# Patient Record
Sex: FEMALE | Race: BLACK OR AFRICAN AMERICAN | Employment: FULL TIME | URBAN - METROPOLITAN AREA
[De-identification: names, ages, dates, MRNs, and addresses within clinical notes are randomized per-mention and may not be internally consistent; named-entity substitution may affect disease eponyms.]

---

## 2022-07-04 ENCOUNTER — APPOINTMENT (OUTPATIENT)
Dept: ULTRASOUND IMAGING | Age: 52
End: 2022-07-04
Payer: COMMERCIAL

## 2022-07-04 ENCOUNTER — HOSPITAL ENCOUNTER (EMERGENCY)
Dept: GENERAL RADIOLOGY | Age: 52
Discharge: HOME | End: 2022-07-07
Payer: COMMERCIAL

## 2022-07-04 ENCOUNTER — HOSPITAL ENCOUNTER (OUTPATIENT)
Age: 52
Setting detail: OBSERVATION
Discharge: HOME | End: 2022-07-06
Attending: EMERGENCY MEDICINE
Payer: COMMERCIAL

## 2022-07-04 DIAGNOSIS — R07.89 ATYPICAL CHEST PAIN: ICD-10-CM

## 2022-07-04 DIAGNOSIS — D64.9 SYMPTOMATIC ANEMIA: Primary | ICD-10-CM

## 2022-07-04 DIAGNOSIS — N93.9 VAGINAL BLEEDING: ICD-10-CM

## 2022-07-04 LAB
ALBUMIN SERPL-MCNC: 3.4 G/DL (ref 3.5–5)
ALBUMIN/GLOB SERPL: 0.8 {RATIO} (ref 1.2–3.5)
ALP SERPL-CCNC: 92 U/L (ref 50–136)
ALT SERPL-CCNC: 17 U/L (ref 12–65)
ANION GAP SERPL CALC-SCNC: 2 MMOL/L (ref 7–16)
AST SERPL-CCNC: 17 U/L (ref 15–37)
BILIRUB SERPL-MCNC: 0.3 MG/DL (ref 0.2–1.1)
BUN SERPL-MCNC: 10 MG/DL (ref 6–23)
CALCIUM SERPL-MCNC: 9.4 MG/DL (ref 8.3–10.4)
CHLORIDE SERPL-SCNC: 112 MMOL/L (ref 98–107)
CO2 SERPL-SCNC: 27 MMOL/L (ref 21–32)
CREAT SERPL-MCNC: 0.8 MG/DL (ref 0.6–1)
EKG ATRIAL RATE: 90 BPM
EKG DIAGNOSIS: NORMAL
EKG P AXIS: 57 DEGREES
EKG P-R INTERVAL: 132 MS
EKG Q-T INTERVAL: 344 MS
EKG QRS DURATION: 74 MS
EKG QTC CALCULATION (BAZETT): 420 MS
EKG R AXIS: -10 DEGREES
EKG T AXIS: 30 DEGREES
EKG VENTRICULAR RATE: 90 BPM
ERYTHROCYTE [DISTWIDTH] IN BLOOD BY AUTOMATED COUNT: 21.1 % (ref 11.9–14.6)
GLOBULIN SER CALC-MCNC: 4.5 G/DL (ref 2.3–3.5)
GLUCOSE SERPL-MCNC: 95 MG/DL (ref 65–100)
HCG UR QL: NEGATIVE
HCG, URINE, POC: NEGATIVE
HCT VFR BLD AUTO: 24.4 % (ref 35.8–46.3)
HGB BLD-MCNC: 6.5 G/DL (ref 11.7–15.4)
HISTORY CHECK: NORMAL
LIPASE SERPL-CCNC: 333 U/L (ref 73–393)
Lab: NORMAL
MAGNESIUM SERPL-MCNC: 2.2 MG/DL (ref 1.8–2.4)
MCH RBC QN AUTO: 16.8 PG (ref 26.1–32.9)
MCHC RBC AUTO-ENTMCNC: 26.6 G/DL (ref 31.4–35)
MCV RBC AUTO: 62.9 FL (ref 79.6–97.8)
NEGATIVE QC PASS/FAIL: NORMAL
NRBC # BLD: 0 K/UL (ref 0–0.2)
PLATELET # BLD AUTO: 343 K/UL (ref 150–450)
PMV BLD AUTO: 9.9 FL (ref 9.4–12.3)
POSITIVE QC PASS/FAIL: NORMAL
POTASSIUM SERPL-SCNC: 4 MMOL/L (ref 3.5–5.1)
PROT SERPL-MCNC: 7.9 G/DL (ref 6.3–8.2)
RBC # BLD AUTO: 3.88 M/UL (ref 4.05–5.2)
SODIUM SERPL-SCNC: 141 MMOL/L (ref 136–145)
TROPONIN I SERPL HS-MCNC: 3.2 PG/ML (ref 0–14)
TROPONIN I SERPL HS-MCNC: 3.4 PG/ML (ref 0–14)
WBC # BLD AUTO: 8.5 K/UL (ref 4.3–11.1)

## 2022-07-04 PROCEDURE — 99285 EMERGENCY DEPT VISIT HI MDM: CPT

## 2022-07-04 PROCEDURE — P9016 RBC LEUKOCYTES REDUCED: HCPCS

## 2022-07-04 PROCEDURE — 6360000002 HC RX W HCPCS: Performed by: PHYSICIAN ASSISTANT

## 2022-07-04 PROCEDURE — 71045 X-RAY EXAM CHEST 1 VIEW: CPT

## 2022-07-04 PROCEDURE — 96374 THER/PROPH/DIAG INJ IV PUSH: CPT

## 2022-07-04 PROCEDURE — 85027 COMPLETE CBC AUTOMATED: CPT

## 2022-07-04 PROCEDURE — 86901 BLOOD TYPING SEROLOGIC RH(D): CPT

## 2022-07-04 PROCEDURE — 86923 COMPATIBILITY TEST ELECTRIC: CPT

## 2022-07-04 PROCEDURE — 96375 TX/PRO/DX INJ NEW DRUG ADDON: CPT

## 2022-07-04 PROCEDURE — 76830 TRANSVAGINAL US NON-OB: CPT

## 2022-07-04 PROCEDURE — 83690 ASSAY OF LIPASE: CPT

## 2022-07-04 PROCEDURE — 85014 HEMATOCRIT: CPT

## 2022-07-04 PROCEDURE — 80053 COMPREHEN METABOLIC PANEL: CPT

## 2022-07-04 PROCEDURE — 81025 URINE PREGNANCY TEST: CPT

## 2022-07-04 PROCEDURE — 93005 ELECTROCARDIOGRAM TRACING: CPT | Performed by: EMERGENCY MEDICINE

## 2022-07-04 PROCEDURE — 84484 ASSAY OF TROPONIN QUANT: CPT

## 2022-07-04 PROCEDURE — 83735 ASSAY OF MAGNESIUM: CPT

## 2022-07-04 RX ORDER — DIPHENHYDRAMINE HYDROCHLORIDE 50 MG/ML
25 INJECTION INTRAMUSCULAR; INTRAVENOUS ONCE
Status: COMPLETED | OUTPATIENT
Start: 2022-07-04 | End: 2022-07-04

## 2022-07-04 RX ORDER — SODIUM CHLORIDE 0.9 % (FLUSH) 0.9 %
3 SYRINGE (ML) INJECTION EVERY 8 HOURS
Status: DISCONTINUED | OUTPATIENT
Start: 2022-07-04 | End: 2022-07-06 | Stop reason: HOSPADM

## 2022-07-04 RX ORDER — SODIUM CHLORIDE 9 MG/ML
INJECTION, SOLUTION INTRAVENOUS PRN
Status: DISCONTINUED | OUTPATIENT
Start: 2022-07-04 | End: 2022-07-05

## 2022-07-04 RX ADMIN — DIPHENHYDRAMINE HYDROCHLORIDE 25 MG: 50 INJECTION, SOLUTION INTRAMUSCULAR; INTRAVENOUS at 19:54

## 2022-07-04 ASSESSMENT — PAIN DESCRIPTION - ORIENTATION: ORIENTATION: LEFT

## 2022-07-04 ASSESSMENT — PAIN - FUNCTIONAL ASSESSMENT: PAIN_FUNCTIONAL_ASSESSMENT: 0-10

## 2022-07-04 ASSESSMENT — PAIN DESCRIPTION - LOCATION: LOCATION: CHEST

## 2022-07-04 ASSESSMENT — PAIN DESCRIPTION - DESCRIPTORS: DESCRIPTORS: ACHING

## 2022-07-04 ASSESSMENT — ENCOUNTER SYMPTOMS
GASTROINTESTINAL NEGATIVE: 1
RESPIRATORY NEGATIVE: 1

## 2022-07-04 ASSESSMENT — PAIN SCALES - GENERAL: PAINLEVEL_OUTOF10: 8

## 2022-07-04 NOTE — ED PROVIDER NOTES
[employer] Emergency Department Provider Note                   PCP:                None Provider               Age: 45 y.o. Sex: female       ICD-10-CM    1. Vaginal bleeding  N93.9    2. Atypical chest pain  R07.89    3. Symptomatic anemia  D64.9        DISPOSITION Decision To Admit 07/05/2022 01:23:07 AM       New Prescriptions    FERROUS SULFATE (IRON 325) 325 (65 FE) MG TABLET    Take 1 tablet by mouth 2 times daily       Orders Placed This Encounter   Procedures    XR CHEST PORTABLE    US PELVIS COMPLETE    Troponin    CBC    Comprehensive Metabolic Panel    Lipase    Magnesium    Hemoglobin and Hematocrit    Hemoglobin and Hematocrit    Cardiac Monitor    Pulse Oximetry    POCT Urine Dipstick    Verify hospital blood product consent form has been signed and witnessed    Vital Signs For Blood Product Transfusion    Transfusion Reaction Management    POC Pregnancy Urine Qual    POC Pregnancy Urine Qual    EKG 12 Lead    TYPE AND SCREEN    PREPARE RBC (CROSSMATCH), 1 Units    Saline lock IV        MDM  Number of Diagnoses or Management Options  Atypical chest pain  Symptomatic anemia  Vaginal bleeding  Diagnosis management comments: Patient is a 68-year-old female who presents with 1 month of vaginal bleeding. Associated with pelvic cramping. Found to be anemic with a hemoglobin of 6.5. Associated symptoms of dizziness and lightheadedness. She received 1 unit of blood and hemoglobin came up to 7.2. Patient still feels lightheaded and dizzy when she stands. Will consult hospitalist for admission. She states she is no longer having vaginal bleeding at this time.        Amount and/or Complexity of Data Reviewed  Discuss the patient with other providers: yes (Dr. Alaina Foster, Dr. Sarah Corrales (Duy Pierson), Dr. Emely Rowell)    Risk of Complications, Morbidity, and/or Mortality  Presenting problems: high  Diagnostic procedures: high  Management options: high    Patient Progress  Patient progress: stable       Ivin Pallas is a 45 y.o. female who presents to the Emergency Department with chief complaint of    Chief Complaint   Patient presents with    Chest Pain    Vaginal Bleeding      Patient is a 66-year-old female who presents with vaginal bleeding for 1 month. She states prior to this daily bleeding she was having regular monthly menstrual cycles. She reports some pelvic cramping. She reports chronic history of anemia for which she take iron supplements. She has an appointment to see [address] [place] [address] in [facility] at the end of August, but due to continued bleeding she came here for evaluation as they were closed today. She denies fever but says she feels dizzy and lightheaded when walking. She states she has had chest pain for 2 weeks. Nonexertional.  Comes and goes intermittently. Sometimes radiates down the left arm. Denies any history of coronary artery disease. No associated symptoms. No modifying factors. Review of Systems   Constitutional: Positive for fatigue. HENT: Negative. Respiratory: Negative. Cardiovascular: Positive for chest pain. Negative for palpitations and leg swelling. Gastrointestinal: Negative. Genitourinary: Positive for pelvic pain and vaginal bleeding. Musculoskeletal: Negative. Neurological: Negative. All other systems reviewed and are negative. No past medical history on file. No past surgical history on file. No family history on file. Social Connections:     Frequency of Communication with Friends and Family: Not on file    Frequency of Social Gatherings with Friends and Family: Not on file    Attends [employer] Services: Not on file    Active Member of Clubs or Organizations: Not on file    Attends Club or Organization Meetings: Not on file    Marital Status: Not on file        No Known Allergies     Vitals signs and nursing note reviewed.    Patient Vitals for the past 4 hrs:   Temp Pulse Resp BP SpO2   07/04/22 2315 -- 60 17 115/72 --   07/04/22 2235 97.5 °F (36.4 °C) 66 16 131/77 100 %   07/04/22 2230 -- 61 15 115/69 --   07/04/22 2215 -- 64 19 118/67 --   07/04/22 2200 -- 60 17 117/65 --   07/04/22 2145 -- 60 19 129/78 --   07/04/22 2130 -- 59 15 113/75 --          Physical Exam  Vitals and nursing note reviewed. Constitutional:       General: She is not in acute distress. Appearance: She is well-developed. She is obese. She is not ill-appearing or toxic-appearing. HENT:      Head: Normocephalic. Eyes:      Extraocular Movements: Extraocular movements intact. Cardiovascular:      Rate and Rhythm: Normal rate and regular rhythm. Pulses: Normal pulses. Heart sounds: Normal heart sounds. Pulmonary:      Effort: Pulmonary effort is normal.      Breath sounds: Normal breath sounds. Abdominal:      Palpations: Abdomen is soft. Tenderness: There is no abdominal tenderness. There is no guarding or rebound. Musculoskeletal:         General: No tenderness. Normal range of motion. Cervical back: Normal range of motion and neck supple. Right lower leg: No edema. Left lower leg: No edema. Lymphadenopathy:      Cervical: No cervical adenopathy. Skin:     General: Skin is warm and dry. Findings: No rash. Neurological:      General: No focal deficit present. Mental Status: She is alert. Mental status is at baseline. Psychiatric:         Mood and Affect: Mood normal.         Behavior: Behavior normal.         Thought Content:  Thought content normal.          Procedures      Labs Reviewed   CBC - Abnormal; Notable for the following components:       Result Value    RBC 3.88 (*)     Hemoglobin 6.5 (*)     Hematocrit 24.4 (*)     MCV 62.9 (*)     MCH 16.8 (*)     MCHC 26.6 (*)     RDW 21.1 (*)     All other components within normal limits   COMPREHENSIVE METABOLIC PANEL - Abnormal; Notable for the following components:    Chloride 112 (*) Anion Gap 2 (*)     Albumin 3.4 (*)     Globulin 4.5 (*)     Albumin/Globulin Ratio 0.8 (*)     All other components within normal limits   HEMOGLOBIN AND HEMATOCRIT - Abnormal; Notable for the following components:    Hemoglobin 7.2 (*)     Hematocrit 26.5 (*)     All other components within normal limits   TROPONIN   TROPONIN   LIPASE   MAGNESIUM   POC PREGNANCY UR-QUAL   POC PREGNANCY UR-QUAL   TYPE AND SCREEN   PREPARE RBC (CROSSMATCH)        US PELVIS COMPLETE   Final Result   1. Heterogeneous appearance of the myometrium. No discrete mass identified. 2.  Nonvisualization of both ovaries. XR CHEST PORTABLE   Final Result   No acute findings in the chest                          ED Course as of 07/05/22 0125   Mon Jul 04, 2022   1728 EKG 12 Lead  EKG shows normal sinus rhythm at rate of 90. No acute ischemic changes. No STEMI. [CYNDI]   1907 I spoke to Dr. Jacki Murillo of [facility] who will have someone from their office call the patient in the morning for a sooner appointment. [CYNDI]   1950 Requesting Benadryl for anxiety at this time. [CYNDI]   200 Pt says she is no longer having any vaginal bleeding [CYNDI]      ED Course User Index  [CYNDI] SAMM Vaz        Voice dictation software was used during the making of this note. This software is not perfect and grammatical and other typographical errors may be present. This note has not been completely proofread for errors.        Dimitry Vaz  07/05/22 5531

## 2022-07-05 PROBLEM — R07.89 ATYPICAL CHEST PAIN: Status: RESOLVED | Noted: 2022-07-05 | Resolved: 2022-07-05

## 2022-07-05 PROBLEM — R42 LIGHT-HEADEDNESS: Status: ACTIVE | Noted: 2022-07-05

## 2022-07-05 PROBLEM — R07.89 ATYPICAL CHEST PAIN: Status: ACTIVE | Noted: 2022-07-05

## 2022-07-05 PROBLEM — D50.0 IRON DEFICIENCY ANEMIA DUE TO CHRONIC BLOOD LOSS: Status: ACTIVE | Noted: 2022-07-05

## 2022-07-05 PROBLEM — R42 LIGHT-HEADEDNESS: Status: RESOLVED | Noted: 2022-07-05 | Resolved: 2022-07-05

## 2022-07-05 PROBLEM — N80.03 ADENOMYOSIS OF UTERUS: Status: ACTIVE | Noted: 2022-07-05

## 2022-07-05 PROBLEM — N93.9 VAGINAL BLEEDING: Status: ACTIVE | Noted: 2022-07-05

## 2022-07-05 PROBLEM — D64.9 SYMPTOMATIC ANEMIA: Status: ACTIVE | Noted: 2022-07-05

## 2022-07-05 LAB
ALBUMIN SERPL-MCNC: 3.1 G/DL (ref 3.5–5)
ALBUMIN/GLOB SERPL: 0.8 {RATIO} (ref 1.2–3.5)
ALP SERPL-CCNC: 81 U/L (ref 50–136)
ALT SERPL-CCNC: 14 U/L (ref 12–65)
ANION GAP SERPL CALC-SCNC: 6 MMOL/L (ref 7–16)
AST SERPL-CCNC: 17 U/L (ref 15–37)
BASOPHILS # BLD: 0.1 K/UL (ref 0–0.2)
BASOPHILS NFR BLD: 1 % (ref 0–2)
BILIRUB SERPL-MCNC: 0.4 MG/DL (ref 0.2–1.1)
BUN SERPL-MCNC: 9 MG/DL (ref 6–23)
CALCIUM SERPL-MCNC: 9.2 MG/DL (ref 8.3–10.4)
CHLORIDE SERPL-SCNC: 109 MMOL/L (ref 98–107)
CO2 SERPL-SCNC: 27 MMOL/L (ref 21–32)
CREAT SERPL-MCNC: 0.7 MG/DL (ref 0.6–1)
DIFFERENTIAL METHOD BLD: ABNORMAL
EOSINOPHIL # BLD: 0.1 K/UL (ref 0–0.8)
EOSINOPHIL NFR BLD: 1 % (ref 0.5–7.8)
ERYTHROCYTE [DISTWIDTH] IN BLOOD BY AUTOMATED COUNT: 23.9 % (ref 11.9–14.6)
FERRITIN SERPL-MCNC: 3 NG/ML (ref 8–388)
FOLATE SERPL-MCNC: 10.4 NG/ML (ref 3.1–17.5)
GLOBULIN SER CALC-MCNC: 4.1 G/DL (ref 2.3–3.5)
GLUCOSE SERPL-MCNC: 93 MG/DL (ref 65–100)
HCT VFR BLD AUTO: 26.5 % (ref 35.8–46.3)
HCT VFR BLD AUTO: 28.9 % (ref 35.8–46.3)
HGB BLD-MCNC: 7.2 G/DL (ref 11.7–15.4)
HGB BLD-MCNC: 8.1 G/DL (ref 11.7–15.4)
HGB RETIC QN AUTO: 16 PG (ref 29–35)
HISTORY CHECK: NORMAL
IMM GRANULOCYTES # BLD AUTO: 0 K/UL (ref 0–0.5)
IMM GRANULOCYTES NFR BLD AUTO: 0 % (ref 0–5)
IMM RETICS NFR: 36.5 % (ref 3–15.9)
IRON SERPL-MCNC: 23 UG/DL (ref 35–150)
LDH SERPL L TO P-CCNC: 154 U/L (ref 100–190)
LYMPHOCYTES # BLD: 1.7 K/UL (ref 0.5–4.6)
LYMPHOCYTES NFR BLD: 25 % (ref 13–44)
MCH RBC QN AUTO: 18.8 PG (ref 26.1–32.9)
MCHC RBC AUTO-ENTMCNC: 28 G/DL (ref 31.4–35)
MCV RBC AUTO: 66.9 FL (ref 79.6–97.8)
MONOCYTES # BLD: 0.6 K/UL (ref 0.1–1.3)
MONOCYTES NFR BLD: 8 % (ref 4–12)
NEUTS SEG # BLD: 4.4 K/UL (ref 1.7–8.2)
NEUTS SEG NFR BLD: 65 % (ref 43–78)
NRBC # BLD: 0 K/UL (ref 0–0.2)
PLATELET # BLD AUTO: 280 K/UL (ref 150–450)
PLATELET COMMENT: ADEQUATE
PMV BLD AUTO: 10 FL (ref 9.4–12.3)
POTASSIUM SERPL-SCNC: 3.8 MMOL/L (ref 3.5–5.1)
PROT SERPL-MCNC: 7.2 G/DL (ref 6.3–8.2)
RBC # BLD AUTO: 4.32 M/UL (ref 4.05–5.2)
RBC MORPH BLD: ABNORMAL
RETICS # AUTO: 0.05 M/UL (ref 0.03–0.1)
RETICS/RBC NFR AUTO: 1.1 % (ref 0.3–2)
SODIUM SERPL-SCNC: 142 MMOL/L (ref 136–145)
TIBC SERPL-MCNC: 419 UG/DL (ref 250–450)
TROPONIN I SERPL HS-MCNC: 3.7 PG/ML (ref 0–14)
TROPONIN I SERPL HS-MCNC: 4.1 PG/ML (ref 0–14)
VIT B12 SERPL-MCNC: 363 PG/ML (ref 193–986)
WBC # BLD AUTO: 6.9 K/UL (ref 4.3–11.1)
WBC MORPH BLD: ABNORMAL

## 2022-07-05 PROCEDURE — 96376 TX/PRO/DX INJ SAME DRUG ADON: CPT

## 2022-07-05 PROCEDURE — 96365 THER/PROPH/DIAG IV INF INIT: CPT

## 2022-07-05 PROCEDURE — 2580000003 HC RX 258: Performed by: HOSPITALIST

## 2022-07-05 PROCEDURE — 2580000003 HC RX 258: Performed by: INTERNAL MEDICINE

## 2022-07-05 PROCEDURE — 80053 COMPREHEN METABOLIC PANEL: CPT

## 2022-07-05 PROCEDURE — G0378 HOSPITAL OBSERVATION PER HR: HCPCS

## 2022-07-05 PROCEDURE — 84484 ASSAY OF TROPONIN QUANT: CPT

## 2022-07-05 PROCEDURE — 6360000002 HC RX W HCPCS: Performed by: OBSTETRICS & GYNECOLOGY

## 2022-07-05 PROCEDURE — 93005 ELECTROCARDIOGRAM TRACING: CPT | Performed by: FAMILY MEDICINE

## 2022-07-05 PROCEDURE — 82746 ASSAY OF FOLIC ACID SERUM: CPT

## 2022-07-05 PROCEDURE — 85046 RETICYTE/HGB CONCENTRATE: CPT

## 2022-07-05 PROCEDURE — 83550 IRON BINDING TEST: CPT

## 2022-07-05 PROCEDURE — 6360000002 HC RX W HCPCS: Performed by: INTERNAL MEDICINE

## 2022-07-05 PROCEDURE — 83615 LACTATE (LD) (LDH) ENZYME: CPT

## 2022-07-05 PROCEDURE — 82607 VITAMIN B-12: CPT

## 2022-07-05 PROCEDURE — 36415 COLL VENOUS BLD VENIPUNCTURE: CPT

## 2022-07-05 PROCEDURE — 96372 THER/PROPH/DIAG INJ SC/IM: CPT

## 2022-07-05 PROCEDURE — 82728 ASSAY OF FERRITIN: CPT

## 2022-07-05 PROCEDURE — 6370000000 HC RX 637 (ALT 250 FOR IP): Performed by: HOSPITALIST

## 2022-07-05 PROCEDURE — 85025 COMPLETE CBC W/AUTO DIFF WBC: CPT

## 2022-07-05 PROCEDURE — 99203 OFFICE O/P NEW LOW 30 MIN: CPT | Performed by: OBSTETRICS & GYNECOLOGY

## 2022-07-05 PROCEDURE — 2580000003 HC RX 258: Performed by: EMERGENCY MEDICINE

## 2022-07-05 PROCEDURE — 36430 TRANSFUSION BLD/BLD COMPNT: CPT

## 2022-07-05 PROCEDURE — 96366 THER/PROPH/DIAG IV INF ADDON: CPT

## 2022-07-05 PROCEDURE — P9040 RBC LEUKOREDUCED IRRADIATED: HCPCS

## 2022-07-05 PROCEDURE — 83540 ASSAY OF IRON: CPT

## 2022-07-05 RX ORDER — SODIUM CHLORIDE 0.9 % (FLUSH) 0.9 %
5-40 SYRINGE (ML) INJECTION PRN
Status: DISCONTINUED | OUTPATIENT
Start: 2022-07-05 | End: 2022-07-06 | Stop reason: HOSPADM

## 2022-07-05 RX ORDER — ONDANSETRON 4 MG/1
4 TABLET, ORALLY DISINTEGRATING ORAL EVERY 8 HOURS PRN
Status: DISCONTINUED | OUTPATIENT
Start: 2022-07-05 | End: 2022-07-06 | Stop reason: HOSPADM

## 2022-07-05 RX ORDER — SODIUM CHLORIDE 9 MG/ML
INJECTION, SOLUTION INTRAVENOUS PRN
Status: DISCONTINUED | OUTPATIENT
Start: 2022-07-05 | End: 2022-07-06 | Stop reason: HOSPADM

## 2022-07-05 RX ORDER — ASPIRIN 81 MG/1
81 TABLET, CHEWABLE ORAL DAILY
Status: ON HOLD | COMMUNITY
End: 2022-07-05 | Stop reason: HOSPADM

## 2022-07-05 RX ORDER — ACETAMINOPHEN 650 MG/1
650 SUPPOSITORY RECTAL EVERY 6 HOURS PRN
Status: DISCONTINUED | OUTPATIENT
Start: 2022-07-05 | End: 2022-07-06 | Stop reason: HOSPADM

## 2022-07-05 RX ORDER — SODIUM CHLORIDE 9 MG/ML
INJECTION, SOLUTION INTRAVENOUS PRN
Status: DISCONTINUED | OUTPATIENT
Start: 2022-07-05 | End: 2022-07-05

## 2022-07-05 RX ORDER — SODIUM CHLORIDE 0.9 % (FLUSH) 0.9 %
5-40 SYRINGE (ML) INJECTION EVERY 12 HOURS SCHEDULED
Status: DISCONTINUED | OUTPATIENT
Start: 2022-07-05 | End: 2022-07-06 | Stop reason: HOSPADM

## 2022-07-05 RX ORDER — ONDANSETRON 2 MG/ML
4 INJECTION INTRAMUSCULAR; INTRAVENOUS EVERY 6 HOURS PRN
Status: DISCONTINUED | OUTPATIENT
Start: 2022-07-05 | End: 2022-07-06 | Stop reason: HOSPADM

## 2022-07-05 RX ORDER — FERROUS SULFATE 325(65) MG
325 TABLET ORAL 2 TIMES DAILY
Qty: 60 TABLET | Refills: 0 | Status: SHIPPED | OUTPATIENT
Start: 2022-07-05

## 2022-07-05 RX ORDER — 0.9 % SODIUM CHLORIDE 0.9 %
500 INTRAVENOUS SOLUTION INTRAVENOUS ONCE
Status: COMPLETED | OUTPATIENT
Start: 2022-07-05 | End: 2022-07-05

## 2022-07-05 RX ORDER — POLYETHYLENE GLYCOL 3350 17 G/17G
17 POWDER, FOR SOLUTION ORAL DAILY PRN
Status: DISCONTINUED | OUTPATIENT
Start: 2022-07-05 | End: 2022-07-06 | Stop reason: HOSPADM

## 2022-07-05 RX ORDER — ACETAMINOPHEN 325 MG/1
650 TABLET ORAL EVERY 6 HOURS PRN
Status: DISCONTINUED | OUTPATIENT
Start: 2022-07-05 | End: 2022-07-06 | Stop reason: HOSPADM

## 2022-07-05 RX ADMIN — LEUPROLIDE ACETATE 3.75 MG: KIT at 13:48

## 2022-07-05 RX ADMIN — SODIUM CHLORIDE 500 ML: 9 INJECTION, SOLUTION INTRAVENOUS at 10:36

## 2022-07-05 RX ADMIN — SODIUM CHLORIDE, PRESERVATIVE FREE 3 ML: 5 INJECTION INTRAVENOUS at 09:29

## 2022-07-05 RX ADMIN — SODIUM CHLORIDE, PRESERVATIVE FREE 3 ML: 5 INJECTION INTRAVENOUS at 17:31

## 2022-07-05 RX ADMIN — SODIUM CHLORIDE, PRESERVATIVE FREE 10 ML: 5 INJECTION INTRAVENOUS at 21:05

## 2022-07-05 RX ADMIN — SODIUM CHLORIDE, PRESERVATIVE FREE 10 ML: 5 INJECTION INTRAVENOUS at 09:24

## 2022-07-05 RX ADMIN — SODIUM CHLORIDE 25 MG: 9 INJECTION, SOLUTION INTRAVENOUS at 09:23

## 2022-07-05 RX ADMIN — ACETAMINOPHEN 650 MG: 325 TABLET, FILM COATED ORAL at 18:45

## 2022-07-05 RX ADMIN — SODIUM CHLORIDE 975 MG: 9 INJECTION, SOLUTION INTRAVENOUS at 11:15

## 2022-07-05 ASSESSMENT — PAIN DESCRIPTION - ORIENTATION: ORIENTATION: MID

## 2022-07-05 ASSESSMENT — PAIN SCALES - GENERAL
PAINLEVEL_OUTOF10: 0
PAINLEVEL_OUTOF10: 4
PAINLEVEL_OUTOF10: 0

## 2022-07-05 ASSESSMENT — PAIN DESCRIPTION - ONSET: ONSET: GRADUAL

## 2022-07-05 ASSESSMENT — ENCOUNTER SYMPTOMS
EYES NEGATIVE: 1
RESPIRATORY NEGATIVE: 1
GASTROINTESTINAL NEGATIVE: 1
ALLERGIC/IMMUNOLOGIC NEGATIVE: 1

## 2022-07-05 ASSESSMENT — PAIN DESCRIPTION - DESCRIPTORS: DESCRIPTORS: ACHING

## 2022-07-05 ASSESSMENT — PAIN DESCRIPTION - LOCATION: LOCATION: HEAD

## 2022-07-05 ASSESSMENT — PAIN DESCRIPTION - FREQUENCY: FREQUENCY: CONTINUOUS

## 2022-07-05 ASSESSMENT — PAIN DESCRIPTION - PAIN TYPE: TYPE: ACUTE PAIN

## 2022-07-05 NOTE — CONSULTS
Date: 7/5/2022        Patient Name: Josafat Arredondo     YOB: 1970          Chief Complaint     Chief Complaint   Patient presents with    Chest Pain    Vaginal Bleeding    us consistant with adenomyosis    Pt 44 yo with hx regular menses till last month. Noted continuous vaginal bleeding x one month, had arranged appt with new gyn, but not till aug. Er for what turned out to be symptomatic anemia    Hx sign reported normal pap 2020  btl with last pregnancy 2006    reletively recent start low dose asa    Surgery, btl, choly, thyroids    History of Present Illness   Josafat Arredondo is a 45 y.o.     hx regular menses till last month. Noted continuous vaginal bleeding x one month, had arranged appt with new gyn, but not till aug. Er for what turned out to be symptomatic anemia    Hx sign reported normal pap 2020  btl with last pregnancy 2006    reletively recent start low dose asa    Surgery, btl, choly, thyroids    No tobacco, occasional MJ    Past Medical History   No past medical history on file. Past Surgical History   No past surgical history on file.      Medications      Current Facility-Administered Medications:     sodium chloride flush 0.9 % injection 5-40 mL, 5-40 mL, IntraVENous, 2 times per day, Galo Sparrow MD    sodium chloride flush 0.9 % injection 5-40 mL, 5-40 mL, IntraVENous, PRN, Jc Anand MD    0.9 % sodium chloride infusion, , IntraVENous, PRN, Galo Sparrow MD    ondansetron (ZOFRAN-ODT) disintegrating tablet 4 mg, 4 mg, Oral, Q8H PRN **OR** ondansetron (ZOFRAN) injection 4 mg, 4 mg, IntraVENous, Q6H PRN, Galo Sparrow MD    polyethylene glycol (GLYCOLAX) packet 17 g, 17 g, Oral, Daily PRN, Galo Sparrow MD    acetaminophen (TYLENOL) tablet 650 mg, 650 mg, Oral, Q6H PRN **OR** acetaminophen (TYLENOL) suppository 650 mg, 650 mg, Rectal, Q6H PRN, Jc Anand MD    0.9 % sodium chloride infusion, , IntraVENous, PRN, Galo Sparrow MD  [facility] iron dextran complex (INFED) 25 mg in sodium chloride 0.9 % 50 mL (test dose) IVPB, 25 mg, IntraVENous, Once, [employer], DO    iron dextran complex (INFED) 975 mg in sodium chloride 0.9 % 500 mL IVPB, 975 mg, IntraVENous, Once, [employer], DO    leuprolide (LUPRON) injection 3.75 mg, 3.75 mg, IntraMUSCular, Once, Fatemeh Duncan MD    [COMPLETED] Saline lock IV, , , Continuous **AND** sodium chloride flush 0.9 % injection 3 mL, 3 mL, IntraVENous, Q8H, Shereen Davenport MD    0.9 % sodium chloride infusion, , IntraVENous, PRN, SAMM Brito     Allergies   Patient has no known allergies. Social History     Social History    None         Family History   No family history on file. Review of Systems   Review of Systems   Constitutional: Positive for fatigue. HENT: Negative. Eyes: Negative. Respiratory: Negative. Cardiovascular: Negative. Gastrointestinal: Negative. Endocrine: Negative. Genitourinary: Positive for menstrual problem and vaginal bleeding. Musculoskeletal: Negative. Skin: Negative. Allergic/Immunologic: Negative. Neurological: Positive for light-headedness. Negative for syncope. Hematological: Negative. Psychiatric/Behavioral: Negative. All other systems reviewed and are negative. Physical Exam     ECOG PERFORMANCE STATUS - 0-Fully active, able to carry on all pre-disease performance without restriction. Blood pressure 124/68, pulse 63, temperature 98.9 °F (37.2 °C), temperature source Oral, resp. rate 18, height 5' 4\" (1.626 m), weight 183 lb 6.4 oz (83.2 kg), SpO2 100 %. Physical Exam  Vitals and nursing note reviewed. Exam conducted with a chaperone present. Constitutional:       Appearance: Normal appearance. She is normal weight. HENT:      Head: Normocephalic and atraumatic. Cardiovascular:      Rate and Rhythm: Normal rate and regular rhythm. Heart sounds: Normal heart sounds.    Pulmonary:      Effort: Pulmonary effort is normal. No respiratory distress. Breath sounds: Normal breath sounds. Abdominal:      General: Abdomen is flat. Palpations: Abdomen is soft. Genitourinary:     General: Normal vulva. Vagina: No vaginal discharge. Comments: No blood in vagina, cervix palpable normal  Musculoskeletal:         General: No swelling. Normal range of motion. Skin:     General: Skin is warm and dry. Neurological:      General: No focal deficit present. Mental Status: She is alert and oriented to person, place, and time. Psychiatric:         Mood and Affect: Mood normal.         Behavior: Behavior normal.         Thought Content:  Thought content normal.         Judgment: Judgment normal.         Labs        Recent Results (from the past 48 hour(s))   POC Pregnancy Urine Qual    Collection Time: 07/04/22 12:00 AM   Result Value Ref Range    HCG, Urine, POC Negative Negative    Lot Number [id number]     Positive QC Pass/Fail Pass     Negative QC Pass/Fail Pass    EKG 12 Lead    Collection Time: 07/04/22  3:55 PM   Result Value Ref Range    Ventricular Rate 90 BPM    Atrial Rate 90 BPM    P-R Interval 132 ms    QRS Duration 74 ms    Q-T Interval 344 ms    QTc Calculation (Bazett) 420 ms    P Axis 57 degrees    R Axis -10 degrees    T Axis 30 degrees    Diagnosis Normal sinus rhythm    Troponin    Collection Time: 07/04/22  3:59 PM   Result Value Ref Range    Troponin, High Sensitivity 3.2 0 - 14 pg/mL   CBC    Collection Time: 07/04/22  3:59 PM   Result Value Ref Range    WBC 8.5 4.3 - 11.1 K/uL    RBC 3.88 (L) 4.05 - 5.2 M/uL    Hemoglobin 6.5 (LL) 11.7 - 15.4 g/dL    Hematocrit 24.4 (L) 35.8 - 46.3 %    MCV 62.9 (L) 79.6 - 97.8 FL    MCH 16.8 (L) 26.1 - 32.9 PG    MCHC 26.6 (L) 31.4 - 35.0 g/dL    RDW 21.1 (H) 11.9 - 14.6 %    Platelets 715 908 - 685 K/uL    MPV 9.9 9.4 - 12.3 FL    nRBC 0.00 0.0 - 0.2 K/uL   Comprehensive Metabolic Panel    Collection Time: 07/04/22  3:59 PM   Result Value Ref Range    Sodium 141 136 - 145 mmol/L    Potassium 4.0 3.5 - 5.1 mmol/L    Chloride 112 (H) 98 - 107 mmol/L    CO2 27 21 - 32 mmol/L    Anion Gap 2 (L) 7 - 16 mmol/L    Glucose 95 65 - 100 mg/dL    BUN 10 6 - 23 MG/DL    CREATININE 0.80 0.6 - 1.0 MG/DL    GFR African American >60 >60 ml/min/1.73m2    GFR Non- >60 >60 ml/min/1.73m2    Calcium 9.4 8.3 - 10.4 MG/DL    Total Bilirubin 0.3 0.2 - 1.1 MG/DL    ALT 17 12 - 65 U/L    AST 17 15 - 37 U/L    Alk Phosphatase 92 50 - 136 U/L    Total Protein 7.9 6.3 - 8.2 g/dL    Albumin 3.4 (L) 3.5 - 5.0 g/dL    Globulin 4.5 (H) 2.3 - 3.5 g/dL    Albumin/Globulin Ratio 0.8 (L) 1.2 - 3.5     Lipase    Collection Time: 07/04/22  3:59 PM   Result Value Ref Range    Lipase 333 73 - 393 U/L   Magnesium    Collection Time: 07/04/22  3:59 PM   Result Value Ref Range    Magnesium 2.2 1.8 - 2.4 mg/dL   PREPARE RBC (CROSSMATCH), 1 Units    Collection Time: 07/04/22  5:45 PM   Result Value Ref Range    History Check Historical check performed    TYPE AND SCREEN    Collection Time: 07/04/22  6:02 PM   Result Value Ref Range    Crossmatch expiration date 07/07/2022,2101     ABO/Rh A POSITIVE     Antibody Screen NEG     Blood Bank Comment       BLOOD READY CALLED ER POD A AT 1855 7.4.22, SPOKE TO [facility]     Unit Number [id number]     Product Code Blood Bank  LR     Unit Divison 00     Dispense Status Blood Bank TRANSFUSED     Crossmatch Result Compatible     Unit Number [id number]     Product Code Blood Bank [facility] LRIR     Unit Divison 00     Dispense Status Blood Bank ISSUED     Crossmatch Result Compatible    Troponin    Collection Time: 07/04/22  6:07 PM   Result Value Ref Range    Troponin, High Sensitivity 3.4 0 - 14 pg/mL   POC Pregnancy Urine Qual    Collection Time: 07/04/22  6:21 PM   Result Value Ref Range    Preg Test, Ur Negative NEG     Hemoglobin and Hematocrit    Collection Time: 07/04/22 11:19 PM   Result Value Ref Range    Hemoglobin 7.2 (L) 11.7 - 15.4 g/dL    Hematocrit 26.5 (L) 35.8 - 46.3 %   PREPARE RBC (CROSSMATCH), 1 Units    Collection Time: 07/05/22  4:00 AM   Result Value Ref Range    History Check Historical check performed         No results found for:      Pathology        Imaging/Diagnostics Last 24 Hours   US PELVIS COMPLETE    Result Date: 7/4/2022  PELVIC ULTRASOUND. HISTORY:  Low abdomen pain and abnormal bleeding TECHNIQUE:  Transabdominal and endovaginal  images were obtained of the pelvis. FINDINGS:  - UTERUS:  11.4 cm in length. Endometrial stripe measures 5 mm. There is heterogeneity of the myometrium. No discrete myometrial or endometrial mass is seen. Neither ovary is identified. No definite adnexal mass. No significant free fluid. 1.  Heterogeneous appearance of the myometrium. No discrete mass identified. 2.  Nonvisualization of both ovaries. XR CHEST PORTABLE    Result Date: 7/4/2022  Chest X-ray INDICATION: Left-sided chest pain for several days A portable AP view of the chest was obtained. FINDINGS: The lungs are clear. There are no infiltrates or effusions. The heart size is normal.  The bony thorax is intact. No acute findings in the chest       Radiology:    CT Result (most recent):  No results found for this or any previous visit from the past 3650 days. PET Results (most recent):  No results found for this or any previous visit from the past 365 days. Mammo Results (most recent):  No results found for this or any previous visit from the past 365 days. US Result (most recent):  US PELVIS COMPLETE 07/04/2022    [facility]  PELVIC ULTRASOUND. HISTORY:  Low abdomen pain and abnormal bleeding    TECHNIQUE:  Transabdominal and endovaginal  images were obtained of the pelvis. FINDINGS:  - UTERUS:  11.4 cm in length. Endometrial stripe measures 5 mm. There is  heterogeneity of the myometrium. No discrete myometrial or endometrial mass is  seen. Neither ovary is identified. No definite adnexal mass. No significant free  fluid. Impression  1. Heterogeneous appearance of the myometrium. No discrete mass identified. 2.  Nonvisualization of both ovaries. Assessment      Hospital Problems           Last Modified POA    * (Principal) Symptomatic anemia 7/5/2022 Yes    Vaginal bleeding 7/5/2022 Yes    Atypical chest pain 7/5/2022 Yes    Adenomyosis of uterus 7/5/2022 Yes        [unfilled]  Specialty Problems     None          Plan   1.transfuse as indicated by symptoms  Iron infusion scheduled  Cont oral iron  Stop asa  Lupron, 3.75--rba reviewed, pt with prior btl  Follow up [facility] office, plan endometrial bx.                 Ewa Waggoner MD  [place] [address]  Office : [phone]  Fax : [phone]

## 2022-07-05 NOTE — ED NOTES
Nurse and PA to go over DC paperwork with patient. Nurse to remove patient IV. Patient to sit up and ambulate. Patient to state they still feel weak and dizzy, states unsure if it was the benadryl. PA to reevaluate patient on possible admission.      Lacy Thompson RN  07/05/22 0123       Lacy Thompson RN  07/05/22 0123       Lacy Thompson RN  07/05/22 0127       Lacy Thompson RN  07/05/22 0127

## 2022-07-05 NOTE — H&P
Hospitalist History and Physical   Admit Date:  2022  4:36 PM   Name:  Татьяна Antonio   Age:  45 y.o. Sex:  female  :  1970   MRN:  [id number]   Room:  ER03/03    Presenting Complaint: Chest Pain and Vaginal Bleeding     Reason(s) for Admission: Vaginal bleeding [N93.9]     History of Present Illness:   Татьяна Antonio is a 45 y.o. female with medical history of history of fibroid uterus presented to emergency room with chief complaint of vaginal bleeding for 1 month, feels dizzy, lightheaded for past few days. Reports decreased energy, generalized weakness for many months. Patient have outpatient GYN appointment but not available until August.  In emergency room basic labs were obtained, labs shows evidence of hemoglobin 6.5, patient was ordered 1 unit of blood transfusion, plan to discharge but when patient went to ambulate still feeling lightheaded. Patient also reports intermittent chest discomfort for past 2 weeks duration, nonexertional, substernal, nonradiating, comes and goes. EKG that was done in emergency room did not show any ST elevations or depressions. Troponins x2 negative. Emergency room physician/PA requested observation of this patient for further evaluation. Review of Systems:  10 systems reviewed and negative except as noted in HPI. Assessment & Plan:     Principal Problem:    Vaginal bleeding: Pelvic ultrasound was done shows heterogeneous appearing myometrium. Will request GYN team to evaluate. Still reports spotting. Active Problems:    Symptomatic anemia: Patient continued to have dizziness, will order another unit of transfusion given that hemoglobin is more than 7 at this time. Requested anemia work-up, will request for iron transfusion as well. Atypical chest pain: Troponins x2 negative, will repeat another set of troponins. EKG did not show any ST elevations or depressions. Dispo/Discharge Planning: Home with family.     Home medication to reconciliate at this time. Diet: ADULT DIET; Regular  VTE ppx: SCD  Code status: Full Code    Hospital Problems:  Principal Problem:    Vaginal bleeding  Active Problems:    Symptomatic anemia    Atypical chest pain  Resolved Problems:    * No resolved hospital problems. *       Past History:   Past medical history: No major past medical problems. Past surgical history: No major past surgical.    Social history: No history of alcohol abuse, no history of drug abuse, history of smoking. Family history: History of hypertension runs in family. Social History     Tobacco Use    Smoking status: Not on file    Smokeless tobacco: Not on file   Substance Use Topics    Alcohol use: Not on file      Social History     Substance and Sexual Activity   Drug Use Not on file     No family history on file. Family history reviewed and negative except as noted above. There is no immunization history on file for this patient.   No Known Allergies  Prior to Admit Medications:  Current Outpatient Medications   Medication Instructions    ferrous sulfate (IRON 325) 325 mg, Oral, 2 TIMES DAILY         Objective:     Patient Vitals for the past 24 hrs:   Temp Pulse Resp BP SpO2   07/04/22 2315 -- 60 17 115/72 --   07/04/22 2235 97.5 °F (36.4 °C) 66 16 131/77 100 %   07/04/22 2230 -- 61 15 115/69 --   07/04/22 2215 -- 64 19 118/67 --   07/04/22 2200 -- 60 17 117/65 --   07/04/22 2145 -- 60 19 129/78 --   07/04/22 2130 -- 59 15 113/75 --   07/04/22 2115 -- 60 15 117/78 --   07/04/22 2045 -- 65 17 131/87 100 %   07/04/22 2026 -- 67 16 129/79 99 %   07/04/22 2016 -- 65 14 (!) 140/76 100 %   07/04/22 2015 96.8 °F (36 °C) 65 14 (!) 140/76 100 %   07/04/22 2001 97.5 °F (36.4 °C) 67 18 (!) 157/85 100 %   07/04/22 1956 -- 77 17 (!) 157/85 100 %   07/04/22 1946 -- 65 18 (!) 141/96 100 %   07/04/22 1556 99 °F (37.2 °C) 90 18 (!) 140/78 99 %       Oxygen Therapy  SpO2: 100 %  Pulse Oximeter Device Mode: Continuous  O2 Device: None (Room air)    There is no height or weight on file to calculate BMI. Intake/Output Summary (Last 24 hours) at 7/5/2022 0342  Last data filed at 7/4/2022 2240  Gross per 24 hour   Intake 620 ml   Output --   Net 620 ml         Physical Exam:    Blood pressure 115/72, pulse 60, temperature 97.5 °F (36.4 °C), temperature source Oral, resp. rate 17, SpO2 100 %. General:    Well nourished. Head:  Normocephalic, atraumatic  Eyes:  Sclerae appear normal.  Pupils equally round. ENT:  Nares appear normal, no drainage. Moist oral mucosa  Neck:  No restricted ROM. Trachea midline   CV:   RRR. No m/r/g. No jugular venous distension. Lungs:   CTAB. No wheezing, rhonchi, or rales. Symmetric expansion. Abdomen: Bowel sounds present. Soft, nontender, nondistended. Extremities: No cyanosis or clubbing. No edema  Skin:     No rashes and normal coloration. Warm and dry. Neuro:  CN II-XII grossly intact. Sensation intact. A&Ox3  Psych:  Normal mood and affect.       I have reviewed ordered lab tests and independently visualized imaging below:    Last 24hr Labs:  Recent Results (from the past 24 hour(s))   EKG 12 Lead    Collection Time: 07/04/22  3:55 PM   Result Value Ref Range    Ventricular Rate 90 BPM    Atrial Rate 90 BPM    P-R Interval 132 ms    QRS Duration 74 ms    Q-T Interval 344 ms    QTc Calculation (Bazett) 420 ms    P Axis 57 degrees    R Axis -10 degrees    T Axis 30 degrees    Diagnosis Normal sinus rhythm    Troponin    Collection Time: 07/04/22  3:59 PM   Result Value Ref Range    Troponin, High Sensitivity 3.2 0 - 14 pg/mL   CBC    Collection Time: 07/04/22  3:59 PM   Result Value Ref Range    WBC 8.5 4.3 - 11.1 K/uL    RBC 3.88 (L) 4.05 - 5.2 M/uL    Hemoglobin 6.5 (LL) 11.7 - 15.4 g/dL    Hematocrit 24.4 (L) 35.8 - 46.3 %    MCV 62.9 (L) 79.6 - 97.8 FL    MCH 16.8 (L) 26.1 - 32.9 PG    MCHC 26.6 (L) 31.4 - 35.0 g/dL    RDW 21.1 (H) 11.9 - 14.6 %    Platelets 871 566 - 554 K/uL    MPV 9.9 9.4 - 12.3 FL    nRBC 0.00 0.0 - 0.2 K/uL   Comprehensive Metabolic Panel    Collection Time: 07/04/22  3:59 PM   Result Value Ref Range    Sodium 141 136 - 145 mmol/L    Potassium 4.0 3.5 - 5.1 mmol/L    Chloride 112 (H) 98 - 107 mmol/L    CO2 27 21 - 32 mmol/L    Anion Gap 2 (L) 7 - 16 mmol/L    Glucose 95 65 - 100 mg/dL    BUN 10 6 - 23 MG/DL    CREATININE 0.80 0.6 - 1.0 MG/DL    GFR African American >60 >60 ml/min/1.73m2    GFR Non- >60 >60 ml/min/1.73m2    Calcium 9.4 8.3 - 10.4 MG/DL    Total Bilirubin 0.3 0.2 - 1.1 MG/DL    ALT 17 12 - 65 U/L    AST 17 15 - 37 U/L    Alk Phosphatase 92 50 - 136 U/L    Total Protein 7.9 6.3 - 8.2 g/dL    Albumin 3.4 (L) 3.5 - 5.0 g/dL    Globulin 4.5 (H) 2.3 - 3.5 g/dL    Albumin/Globulin Ratio 0.8 (L) 1.2 - 3.5     Lipase    Collection Time: 07/04/22  3:59 PM   Result Value Ref Range    Lipase 333 73 - 393 U/L   Magnesium    Collection Time: 07/04/22  3:59 PM   Result Value Ref Range    Magnesium 2.2 1.8 - 2.4 mg/dL   PREPARE RBC (CROSSMATCH), 1 Units    Collection Time: 07/04/22  5:45 PM   Result Value Ref Range    History Check Historical check performed    TYPE AND SCREEN    Collection Time: 07/04/22  6:02 PM   Result Value Ref Range    Crossmatch expiration date 07/07/2022,8709     ABO/Rh A POSITIVE     Antibody Screen NEG     Blood Bank Comment       BLOOD READY CALLED ER POD A AT 1855 7.4.22, SPOKE TO [facility]     Unit Number [id number]     Product Code Blood Bank RC LR     Unit Divison 00     Dispense Status Blood Bank ISSUED     Crossmatch Result Compatible    Troponin    Collection Time: 07/04/22  6:07 PM   Result Value Ref Range    Troponin, High Sensitivity 3.4 0 - 14 pg/mL   POC Pregnancy Urine Qual    Collection Time: 07/04/22  6:21 PM   Result Value Ref Range    Preg Test, Ur Negative NEG     Hemoglobin and Hematocrit    Collection Time: 07/04/22 11:19 PM   Result Value Ref Range    Hemoglobin 7.2 (L) 11.7 - 15.4 g/dL    Hematocrit 26.5 (L) 35.8 - 46.3 %       Other Studies:  US PELVIS COMPLETE    Result Date: 7/4/2022  PELVIC ULTRASOUND. HISTORY:  Low abdomen pain and abnormal bleeding TECHNIQUE:  Transabdominal and endovaginal  images were obtained of the pelvis. FINDINGS:  - UTERUS:  11.4 cm in length. Endometrial stripe measures 5 mm. There is heterogeneity of the myometrium. No discrete myometrial or endometrial mass is seen. Neither ovary is identified. No definite adnexal mass. No significant free fluid. 1.  Heterogeneous appearance of the myometrium. No discrete mass identified. 2.  Nonvisualization of both ovaries. XR CHEST PORTABLE    Result Date: 7/4/2022  Chest X-ray INDICATION: Left-sided chest pain for several days A portable AP view of the chest was obtained. FINDINGS: The lungs are clear. There are no infiltrates or effusions. The heart size is normal.  The bony thorax is intact. No acute findings in the chest       Echocardiogram:  No results found for this or any previous visit. Meds previously ordered:  Orders Placed This Encounter   Medications    sodium chloride flush 0.9 % injection 3 mL    0.9 % sodium chloride infusion    diphenhydrAMINE (BENADRYL) injection 25 mg    ferrous sulfate (IRON 325) 325 (65 Fe) MG tablet     Sig: Take 1 tablet by mouth 2 times daily     Dispense:  60 tablet     Refill:  0    sodium chloride flush 0.9 % injection 5-40 mL    sodium chloride flush 0.9 % injection 5-40 mL    0.9 % sodium chloride infusion    OR Linked Order Group     ondansetron (ZOFRAN-ODT) disintegrating tablet 4 mg     ondansetron (ZOFRAN) injection 4 mg    polyethylene glycol (GLYCOLAX) packet 17 g    OR Linked Order Group     acetaminophen (TYLENOL) tablet 650 mg     acetaminophen (TYLENOL) suppository 650 mg           Signed:  Stephania Tracy MD    Part of this note may have been written by using a voice dictation software.   The note has been proof read but may still contain some grammatical/other typographical errors.

## 2022-07-05 NOTE — PROGRESS NOTES
I went in to discharge this patient and the PCT [place] was at bedside checking vitals & orthostatics. BP's elevated. Temp 99.0 but patient feels warm. She said she was having chest pain as well. Looks unwell. Notified PM Physician Dr. Edil Camarena via [employer]. Ordered a heart monitor for observation as well as a STAT EKG. Discontinued discharge for today.

## 2022-07-05 NOTE — ED NOTES
I have reviewed discharge instructions with the patient. The patient verbalized understanding. Patient left ED via Discharge Method: ambulatory to Home with family  Opportunity for questions and clarification provided. Patient given 0 scripts. To continue your aftercare when you leave the hospital, you may receive an automated call from our care team to check in on how you are doing. This is a free service and part of our promise to provide the best care and service to meet your aftercare needs.  If you have questions, or wish to unsubscribe from this service please call [phone]. Thank you for Choosing our [facility] Emergency Department.         Tommy Jhaveri RN  07/05/22 0301

## 2022-07-05 NOTE — CONSENT
Informed Consent for Blood Component Transfusion Note    I have discussed with the patient the rationale for blood component transfusion; its benefits in treating or preventing fatigue, organ damage, or death; and its risk which includes mild transfusion reactions, rare risk of blood borne infection, or more serious but rare reactions. I have discussed the alternatives to transfusion, including the risk and consequences of not receiving transfusion. The patient had an opportunity to ask questions and had agreed to proceed with transfusion of blood components.     Electronically signed by Eloisa Laboy MD on 7/5/22 at 3:54 AM EDT

## 2022-07-05 NOTE — PROGRESS NOTES
Bedside and Verbal shift change report given to Chloe Mckeon RN (oncoming nurse) by self (offgoing nurse). Report included the following information Nurse Handoff Report, Index, Intake/Output, MAR and Recent Results. Putting pt on a heart monitor. No gtts infusing at bedside.

## 2022-07-05 NOTE — PROGRESS NOTES
Pt skin is dry and intact with no redness or breakdown. Pt sacrum and heels have no breakdown, are clean dry and intact with no redness. Pt skin assessed with Amber Garcia RN.

## 2022-07-05 NOTE — CARE COORDINATION
Pt presented to the ED c/o vaginal bleeding for one month. Pt has an OP GYN appointment but not available until August. Pts Hgb 6.5; admitted for symptomatic anemia and given iron infusions. Pt may discharge later today. Pt lives alone in a single story home. Is indep with her ADLs at baseline; works FT and is able to drive herself. On RA. Denies DME use. PCP confirmed; reported that she could not remember the name of the MD but he is with [facility] in [place]. Insurance verified. Pt will arrange her own transportation at d/c, No d/c needs identified but will remain available. 1641-Discharge order is in. Pt is discharging home in stable condition. No discharge needs were identified. Tx goals have been met. 07/05/22 1100   Service Assessment   Patient Orientation Alert and Oriented   Cognition Alert   History Provided By Patient   Primary Caregiver Self   Support Systems Spouse/Significant Other;Children;Family Members;[employer] [employer];Friends/Neighbors   PCP Verified by CM Yes  (Does not remember the name but is with [facility] in [place])   Last Visit to PCP Within last 3 months   Prior Functional Level Independent in ADLs/IADLs   Current Functional Level Independent in ADLs/IADLs   Can patient return to prior living arrangement Yes   Ability to make needs known: Good   Family able to assist with home care needs: Yes   Social/Functional History   Lives With Alone   ADL Assistance Independent   Active  Yes   Mode of Transportation Car   Occupation Full time employment   Discharge Planning   Type of Residence House   Living Arrangements Alone; Children   Current Services Prior To Admission None   Potential Assistance Needed N/A   DME Ordered? No   Potential Assistance Purchasing Medications No   Type of Home Care Services None   Patient expects to be discharged to: [place] [facility] Discharge   Services At/After Discharge None   [facility] Information Provided?  No   Mode of Transport at Discharge Other (see comment)  (Family)   Confirm Follow Up Transport Self

## 2022-07-05 NOTE — PROGRESS NOTES
Discussed lupron injection that is ordered for patient. Must wait until it comes from [facility]. Understand directions for administration.

## 2022-07-05 NOTE — ED NOTES
TRANSFER - OUT REPORT:    Verbal report given to [facility] AUTHORITY RN on Tammy Leigh  being transferred to 3rd floor for routine progression of patient care       Report consisted of patient's Situation, Background, Assessment and   Recommendations(SBAR). Information from the following report(s) ED SBAR was reviewed with the receiving nurse. Lines:   Peripheral IV 07/05/22 Right Forearm (Active)        Opportunity for questions and clarification was provided.       Patient transported with:  Gadiel Lipscomb RN  07/05/22 5293

## 2022-07-05 NOTE — DISCHARGE SUMMARY
Hospitalist Discharge Summary   Admit Date:  2022  4:36 PM   DC Note date: 2022  Name:  Татьяна Antonio   Age:  45 y.o. Sex:  female  :  1970   MRN:  [id number]   Room:  [facility]  PCP:  None Provider    Presenting Complaint: Chest Pain and Vaginal Bleeding     Initial Admission Diagnosis: Vaginal bleeding [N93.9]  Atypical chest pain [R07.89]  Symptomatic anemia [D64.9]     Problem List for this Hospitalization (present on admission):    Principal Problem:    Symptomatic anemia  Active Problems:    Vaginal bleeding    Adenomyosis of uterus    Iron deficiency anemia due to chronic blood loss  Resolved Problems:    Atypical chest pain    Light-headedness    Did Patient have Sepsis (YES OR NO): NO    From H&P:  \"46 y.o. female with medical history of history of fibroid uterus presented to emergency room with chief complaint of vaginal bleeding for 1 month, feels dizzy, lightheaded for past few days. Reports decreased energy, generalized weakness for many months. Patient have outpatient GYN appointment but not available until August.  In emergency room basic labs were obtained, labs shows evidence of hemoglobin 6.5, patient was ordered 1 unit of blood transfusion, plan to discharge but when patient went to ambulate still feeling lightheaded. Patient also reports intermittent chest discomfort for past 2 weeks duration, nonexertional, substernal, nonradiating, comes and goes. EKG that was done in emergency room did not show any ST elevations or depressions. Troponins x2 negative. Emergency room physician/PA requested observation of this patient for further evaluation. She was admitted and give 1U PRBC. Her Hgb came up to 1U PRBC. She was given 1000mg Infed. Gyn Onc evaluated her and recommended Lupron. Vaginal exam was normal. She remained stable and her symptoms improved. Orthostatics were normal. She was discharged home. \"    Hospital Course:   She was admitted and give 1U PRBC.  Her Hgb came up to 1U PRBC. She was given 1000mg Infed. Gyn Onc evaluated her and recommended Lupron. Vaginal exam was normal. She remained stable and her symptoms improved. Orthostatics were normal. She was discharged home. Disposition: Home  Diet: ADULT DIET; Regular  Code Status: Full Code    Follow Ups:   Follow-up Information     MARTA MARTINEZ NP In 1 week. Specialty: Nurse Practitioner  Contact information:  [address] [facility] [address]  [address] [facility]             Abbie Stroud MD In 2 weeks. Specialty: Gynecologic Oncology  Contact information:  [address] [address] [facility] [address] [phone]                       Follow up labs/diagnostics (ultimately defer to outpatient provider):  Hgb in 1 week      Time spent in patient discharge and coordination 35 minutes. Plan was discussed with patient, Gyn, and nursing. All questions answered. Patient was stable at time of discharge. Instructions given to call a physician or return if any concerns. Current Discharge Medication List      START taking these medications    Details   ferrous sulfate (IRON 325) 325 (65 Fe) MG tablet Take 1 tablet by mouth 2 times daily  Qty: 60 tablet, Refills: 0         STOP taking these medications       aspirin 81 MG chewable tablet Comments:   Reason for Stopping:               Procedures done this admission:  * No surgery found *    Consults this admission:  IP CONSULT TO GYNECOLOGIC ONCOLOGY    Echocardiogram results:  No results found for this or any previous visit. Diagnostic Imaging/Tests:   US PELVIS COMPLETE    Result Date: 7/4/2022  PELVIC ULTRASOUND. HISTORY:  Low abdomen pain and abnormal bleeding TECHNIQUE:  Transabdominal and endovaginal  images were obtained of the pelvis. FINDINGS:  - UTERUS:  11.4 cm in length. Endometrial stripe measures 5 mm. There is heterogeneity of the myometrium.   No discrete myometrial or endometrial mass is seen. Neither ovary is identified. No definite adnexal mass. No significant free fluid. 1.  Heterogeneous appearance of the myometrium. No discrete mass identified. 2.  Nonvisualization of both ovaries. XR CHEST PORTABLE    Result Date: 7/4/2022  Chest X-ray INDICATION: Left-sided chest pain for several days A portable AP view of the chest was obtained. FINDINGS: The lungs are clear. There are no infiltrates or effusions. The heart size is normal.  The bony thorax is intact.       No acute findings in the chest         Labs: Results:       BMP, Mg, Phos Recent Labs     07/04/22 1559 07/05/22  0742    142   K 4.0 3.8   * 109*   CO2 27 27   ANIONGAP 2* 6*   BUN 10 9   CREATININE 0.80 0.70   LABGLOM >60 >60   GFRAA >60 >60   CALCIUM 9.4 9.2   GLUCOSE 95 93      CBC Recent Labs     07/04/22 1559 07/04/22  2319 07/05/22  0742   WBC 8.5  --  6.9   RBC 3.88*  --  4.32   HGB 6.5* 7.2* 8.1*   HCT 24.4* 26.5* 28.9*   MCV 62.9*  --  66.9*   MCH 16.8*  --  18.8*   MCHC 26.6*  --  28.0*   RDW 21.1*  --  23.9*     --  280   MPV 9.9  --  10.0   NRBC 0.00  --  0.00   SEGS  --   --  65   LYMPHOPCT  --   --  25   EOSRELPCT  --   --  1   MONOPCT  --   --  8   BASOPCT  --   --  1   IMMGRAN  --   --  0   SEGSABS  --   --  4.4   LYMPHSABS  --   --  1.7   EOSABS  --   --  0.1   MONOSABS  --   --  0.6   BASOSABS  --   --  0.1   ABSIMMGRAN  --   --  0.0      LFT Recent Labs     07/04/22 1559 07/05/22  0742   BILITOT 0.3 0.4   ALKPHOS 92 81   AST 17 17   ALT 17 14   PROT 7.9 7.2   LABALBU 3.4* 3.1*   GLOB 4.5* 4.1*      Cardiac  Lab Results   Component Value Date/Time    TROPHS 3.7 07/05/2022 12:38 PM    TROPHS 4.1 07/05/2022 07:42 AM    TROPHS 3.4 07/04/2022 06:07 PM      Coags No results found for: PROTIME, INR, APTT   A1c No results found for: LABA1C, EAG   Lipids No results found for: CHOL, LDLCALC, LABVLDL, HDL, CHOLHDLRATIO, TRIG   Thyroid  No results found for: Carlyn Snellen Most Recent UA No results found for: COLORU, APPEARANCE, SPECGRAV, LABPH, PROTEINU, GLUCOSEU, KETUA, BILIRUBINUR, BLOODU, UROBILINOGEN, NITRU, LEUKOCYTESUR, WBCUA, RBCUA, EPITHUA, BACTERIA, LABCAST, MUCUS       All Labs from Last 24 Hrs:  Recent Results (from the past 24 hour(s))   EKG 12 Lead    Collection Time: 07/04/22  3:55 PM   Result Value Ref Range    Ventricular Rate 90 BPM    Atrial Rate 90 BPM    P-R Interval 132 ms    QRS Duration 74 ms    Q-T Interval 344 ms    QTc Calculation (Bazett) 420 ms    P Axis 57 degrees    R Axis -10 degrees    T Axis 30 degrees    Diagnosis Normal sinus rhythm    Troponin    Collection Time: 07/04/22  3:59 PM   Result Value Ref Range    Troponin, High Sensitivity 3.2 0 - 14 pg/mL   CBC    Collection Time: 07/04/22  3:59 PM   Result Value Ref Range    WBC 8.5 4.3 - 11.1 K/uL    RBC 3.88 (L) 4.05 - 5.2 M/uL    Hemoglobin 6.5 (LL) 11.7 - 15.4 g/dL    Hematocrit 24.4 (L) 35.8 - 46.3 %    MCV 62.9 (L) 79.6 - 97.8 FL    MCH 16.8 (L) 26.1 - 32.9 PG    MCHC 26.6 (L) 31.4 - 35.0 g/dL    RDW 21.1 (H) 11.9 - 14.6 %    Platelets 061 854 - 393 K/uL    MPV 9.9 9.4 - 12.3 FL    nRBC 0.00 0.0 - 0.2 K/uL   Comprehensive Metabolic Panel    Collection Time: 07/04/22  3:59 PM   Result Value Ref Range    Sodium 141 136 - 145 mmol/L    Potassium 4.0 3.5 - 5.1 mmol/L    Chloride 112 (H) 98 - 107 mmol/L    CO2 27 21 - 32 mmol/L    Anion Gap 2 (L) 7 - 16 mmol/L    Glucose 95 65 - 100 mg/dL    BUN 10 6 - 23 MG/DL    CREATININE 0.80 0.6 - 1.0 MG/DL    GFR African American >60 >60 ml/min/1.73m2    GFR Non- >60 >60 ml/min/1.73m2    Calcium 9.4 8.3 - 10.4 MG/DL    Total Bilirubin 0.3 0.2 - 1.1 MG/DL    ALT 17 12 - 65 U/L    AST 17 15 - 37 U/L    Alk Phosphatase 92 50 - 136 U/L    Total Protein 7.9 6.3 - 8.2 g/dL    Albumin 3.4 (L) 3.5 - 5.0 g/dL    Globulin 4.5 (H) 2.3 - 3.5 g/dL    Albumin/Globulin Ratio 0.8 (L) 1.2 - 3.5     Lipase    Collection Time: 07/04/22  3:59 PM   Result Value Result Value Ref Range     100 - 190 U/L   CBC with Auto Differential    Collection Time: 07/05/22  7:42 AM   Result Value Ref Range    WBC 6.9 4.3 - 11.1 K/uL    RBC 4.32 4.05 - 5.2 M/uL    Hemoglobin 8.1 (L) 11.7 - 15.4 g/dL    Hematocrit 28.9 (L) 35.8 - 46.3 %    MCV 66.9 (L) 79.6 - 97.8 FL    MCH 18.8 (L) 26.1 - 32.9 PG    MCHC 28.0 (L) 31.4 - 35.0 g/dL    RDW 23.9 (H) 11.9 - 14.6 %    Platelets 278 167 - 238 K/uL    MPV 10.0 9.4 - 12.3 FL    nRBC 0.00 0.0 - 0.2 K/uL    Seg Neutrophils 65 43 - 78 %    Lymphocytes 25 13 - 44 %    Monocytes 8 4.0 - 12.0 %    Eosinophils % 1 0.5 - 7.8 %    Basophils 1 0.0 - 2.0 %    Immature Granulocytes 0 0.0 - 5.0 %    Segs Absolute 4.4 1.7 - 8.2 K/UL    Absolute Lymph # 1.7 0.5 - 4.6 K/UL    Absolute Mono # 0.6 0.1 - 1.3 K/UL    Absolute Eos # 0.1 0.0 - 0.8 K/UL    Basophils Absolute 0.1 0.0 - 0.2 K/UL    Absolute Immature Granulocyte 0.0 0.0 - 0.5 K/UL    RBC Comment SLIGHT  ANISOCYTOSIS + POIKILOCYTOSIS        WBC Comment Result Confirmed By Smear      Platelet Comment ADEQUATE      Differential Type AUTOMATED     Comprehensive Metabolic Panel w/ Reflex to MG    Collection Time: 07/05/22  7:42 AM   Result Value Ref Range    Sodium 142 136 - 145 mmol/L    Potassium 3.8 3.5 - 5.1 mmol/L    Chloride 109 (H) 98 - 107 mmol/L    CO2 27 21 - 32 mmol/L    Anion Gap 6 (L) 7 - 16 mmol/L    Glucose 93 65 - 100 mg/dL    BUN 9 6 - 23 MG/DL    CREATININE 0.70 0.6 - 1.0 MG/DL    GFR African American >60 >60 ml/min/1.73m2    GFR Non- >60 >60 ml/min/1.73m2    Calcium 9.2 8.3 - 10.4 MG/DL    Total Bilirubin 0.4 0.2 - 1.1 MG/DL    ALT 14 12 - 65 U/L    AST 17 15 - 37 U/L    Alk Phosphatase 81 50 - 136 U/L    Total Protein 7.2 6.3 - 8.2 g/dL    Albumin 3.1 (L) 3.5 - 5.0 g/dL    Globulin 4.1 (H) 2.3 - 3.5 g/dL    Albumin/Globulin Ratio 0.8 (L) 1.2 - 3.5     Reticulocytes    Collection Time: 07/05/22  7:42 AM   Result Value Ref Range    Reticulocyte Count,Automated 1.1 0.3 - 2.0 %    Absolute Retic # 0.0484 0.026 - 0.095 M/ul    Immature Retic Fraction 36.5 (H) 3.0 - 15.9 %    Retic Hemoglobin conc. 16 (L) 29 - 35 pg   Vitamin B12    Collection Time: 07/05/22  7:42 AM   Result Value Ref Range    Vitamin B-12 363 193 - 986 pg/mL   Folate    Collection Time: 07/05/22  7:42 AM   Result Value Ref Range    Folate 10.4 3.1 - 17.5 ng/mL   Troponin    Collection Time: 07/05/22 12:38 PM   Result Value Ref Range    Troponin, High Sensitivity 3.7 0 - 14 pg/mL       No Known Allergies    There is no immunization history on file for this patient. Recent Vital Data:  Patient Vitals for the past 24 hrs:   Temp Pulse Resp BP SpO2   07/05/22 1345 98.7 °F (37.1 °C) 73 20 (!) 141/73 --   07/05/22 0722 98.9 °F (37.2 °C) 63 18 124/68 100 %   07/05/22 0633 98.2 °F (36.8 °C) 63 18 121/69 98 %   07/05/22 0533 98.2 °F (36.8 °C) 58 18 130/76 99 %   07/05/22 0518 98.6 °F (37 °C) 60 18 136/81 100 %   07/05/22 0459 98.4 °F (36.9 °C) 67 16 129/84 99 %   07/05/22 0429 98.6 °F (37 °C) 58 14 114/67 97 %   07/04/22 2315 -- 60 17 115/72 --   07/04/22 2235 97.5 °F (36.4 °C) 66 16 131/77 100 %   07/04/22 2230 -- 61 15 115/69 --   07/04/22 2215 -- 64 19 118/67 --   07/04/22 2200 -- 60 17 117/65 --   07/04/22 2145 -- 60 19 129/78 --   07/04/22 2130 -- 59 15 113/75 --   07/04/22 2115 -- 60 15 117/78 --   07/04/22 2045 -- 65 17 131/87 100 %   07/04/22 2026 -- 67 16 129/79 99 %   07/04/22 2016 -- 65 14 (!) 140/76 100 %   07/04/22 2015 96.8 °F (36 °C) 65 14 (!) 140/76 100 %   07/04/22 2001 97.5 °F (36.4 °C) 67 18 (!) 157/85 100 %   07/04/22 1956 -- 77 17 (!) 157/85 100 %   07/04/22 1946 -- 65 18 (!) 141/96 100 %   07/04/22 1556 99 °F (37.2 °C) 90 18 (!) 140/78 99 %       Oxygen Therapy  SpO2: 100 %  Pulse Oximeter Device Mode: Continuous  O2 Device: None (Room air)    Estimated body mass index is 31.48 kg/m² as calculated from the following:    Height as of this encounter: 5' 4\" (1.626 m).     Weight as of this encounter: 183 lb 6.4 oz (83.2 kg). Intake/Output Summary (Last 24 hours) at 7/5/2022 1555  Last data filed at 7/5/2022 1345  Gross per 24 hour   Intake 1340 ml   Output --   Net 1340 ml         Physical Exam:  General:    Well nourished. No overt distress  Head:  Normocephalic, atraumatic  Eyes:  Sclerae appear normal.  Pupils equally round. HENT:  Nares appear normal, no drainage. Moist mucous membranes  Neck:  No restricted ROM. Trachea midline  CV:   RRR. No m/r/g. No JVD  Lungs:   CTAB. No wheezing, rhonchi, or rales. Respirations even, unlabored  Abdomen:   Soft, nontender, nondistended. Extremities: Warm and dry. No cyanosis or clubbing. No edema. Skin:     No rashes. Normal coloration  Neuro:  CN II-XII grossly intact. Psych:  Normal mood and affect. Signed:  Nael Gillespie DO    Part of this note may have been written by using a voice dictation software. The note has been proof read but may still contain some grammatical/other typographical errors.

## 2022-07-05 NOTE — PROGRESS NOTES
TRANSFER - IN REPORT:    Verbal report received from [id number] on [employer]  being received from ER for routine progression of patient care      Report consisted of patient's Situation, Background, Assessment and   Recommendations(SBAR). Information from the following report(s) Nurse Handoff Report, Index, Adult Overview, Intake/Output, MAR and Cardiac Rhythm SR was reviewed with the receiving nurse. Opportunity for questions and clarification was provided. Assessment completed upon patient's arrival to unit and care assumed.

## 2022-07-05 NOTE — PLAN OF CARE
Problem: Discharge Planning  Goal: Discharge to home or other facility with appropriate resources  7/5/2022 1732 by Jean Lenz RN  Outcome: Completed  7/5/2022 0546 by John Dominguez RN  Outcome: Progressing     Problem: Pain  Goal: Verbalizes/displays adequate comfort level or baseline comfort level  7/5/2022 1732 by Jean Lenz RN  Outcome: Completed  7/5/2022 0546 by John Dominguez RN  Outcome: Progressing     Problem: Safety - Adult  Goal: Free from fall injury  7/5/2022 1732 by Jean Lenz RN  Outcome: Completed  7/5/2022 0546 by John Dominguez RN  Outcome: Progressing

## 2022-07-05 NOTE — PROGRESS NOTES
Bedside and Verbal shift change report given to self (oncoming nurse) by Tiago Aguilar RN (offgoing nurse). Report included the following information Nurse Handoff Report, Index, ED SBAR, Intake/Output, MAR and Recent Results. Pt is nonmonitored. Blood infusing at 200ml/hr. Verified in the flowsheets. Next set of vitals at 3848.

## 2022-07-06 ENCOUNTER — APPOINTMENT (OUTPATIENT)
Dept: CT IMAGING | Age: 52
End: 2022-07-06
Payer: COMMERCIAL

## 2022-07-06 VITALS
RESPIRATION RATE: 20 BRPM | WEIGHT: 183.4 LBS | DIASTOLIC BLOOD PRESSURE: 77 MMHG | OXYGEN SATURATION: 99 % | HEIGHT: 64 IN | SYSTOLIC BLOOD PRESSURE: 140 MMHG | BODY MASS INDEX: 31.31 KG/M2 | TEMPERATURE: 97.7 F | HEART RATE: 51 BPM

## 2022-07-06 LAB
ABO + RH BLD: NORMAL
BLD PROD TYP BPU: NORMAL
BLD PROD TYP BPU: NORMAL
BLOOD BANK CMNT PATIENT-IMP: NORMAL
BLOOD BANK DISPENSE STATUS: NORMAL
BLOOD BANK DISPENSE STATUS: NORMAL
BLOOD GROUP ANTIBODIES SERPL: NORMAL
BPU ID: NORMAL
BPU ID: NORMAL
CROSSMATCH RESULT: NORMAL
CROSSMATCH RESULT: NORMAL
EKG ATRIAL RATE: 61 BPM
EKG DIAGNOSIS: NORMAL
EKG P AXIS: 50 DEGREES
EKG P-R INTERVAL: 144 MS
EKG Q-T INTERVAL: 408 MS
EKG QRS DURATION: 76 MS
EKG QTC CALCULATION (BAZETT): 410 MS
EKG R AXIS: -19 DEGREES
EKG T AXIS: 16 DEGREES
EKG VENTRICULAR RATE: 61 BPM
HCT VFR BLD AUTO: 29.2 % (ref 35.8–46.3)
HGB BLD-MCNC: 8.2 G/DL (ref 11.7–15.4)
SPECIMEN EXP DATE BLD: NORMAL
UNIT DIVISION: 0
UNIT DIVISION: 0

## 2022-07-06 PROCEDURE — G0378 HOSPITAL OBSERVATION PER HR: HCPCS

## 2022-07-06 PROCEDURE — 70450 CT HEAD/BRAIN W/O DYE: CPT

## 2022-07-06 PROCEDURE — 36415 COLL VENOUS BLD VENIPUNCTURE: CPT

## 2022-07-06 PROCEDURE — 96375 TX/PRO/DX INJ NEW DRUG ADDON: CPT

## 2022-07-06 PROCEDURE — 85018 HEMOGLOBIN: CPT

## 2022-07-06 PROCEDURE — 6360000002 HC RX W HCPCS: Performed by: INTERNAL MEDICINE

## 2022-07-06 PROCEDURE — 6370000000 HC RX 637 (ALT 250 FOR IP): Performed by: HOSPITALIST

## 2022-07-06 PROCEDURE — 2580000003 HC RX 258: Performed by: HOSPITALIST

## 2022-07-06 RX ORDER — KETOROLAC TROMETHAMINE 30 MG/ML
30 INJECTION, SOLUTION INTRAMUSCULAR; INTRAVENOUS ONCE
Status: COMPLETED | OUTPATIENT
Start: 2022-07-06 | End: 2022-07-06

## 2022-07-06 RX ORDER — NITROGLYCERIN 0.4 MG/1
0.4 TABLET SUBLINGUAL EVERY 5 MIN PRN
Qty: 25 TABLET | Refills: 3 | Status: SHIPPED | OUTPATIENT
Start: 2022-07-06

## 2022-07-06 RX ADMIN — KETOROLAC TROMETHAMINE 30 MG: 30 INJECTION, SOLUTION INTRAMUSCULAR at 13:57

## 2022-07-06 RX ADMIN — SODIUM CHLORIDE, PRESERVATIVE FREE 10 ML: 5 INJECTION INTRAVENOUS at 09:07

## 2022-07-06 RX ADMIN — ACETAMINOPHEN 650 MG: 325 TABLET, FILM COATED ORAL at 16:41

## 2022-07-06 ASSESSMENT — PAIN SCALES - GENERAL
PAINLEVEL_OUTOF10: 0
PAINLEVEL_OUTOF10: 0
PAINLEVEL_OUTOF10: 3
PAINLEVEL_OUTOF10: 0
PAINLEVEL_OUTOF10: 10

## 2022-07-06 ASSESSMENT — PAIN DESCRIPTION - LOCATION
LOCATION: HEAD
LOCATION: HEAD

## 2022-07-06 NOTE — PLAN OF CARE
Problem: Safety - Adult  Goal: Free from fall injury  Outcome: Progressing     Problem: Pain  Goal: Verbalizes/displays adequate comfort level or baseline comfort level  Outcome: Progressing     Problem: Discharge Planning  Goal: Discharge to home or other facility with appropriate resources  7/5/2022 1732 by Jean Lenz RN  Outcome: Completed     Problem: Pain  Goal: Verbalizes/displays adequate comfort level or baseline comfort level  7/5/2022 1732 by Jean Lenz RN  Outcome: Completed     Problem: Safety - Adult  Goal: Free from fall injury  7/5/2022 1732 by Jean Lenz RN  Outcome: Completed

## 2022-07-06 NOTE — PROGRESS NOTES
[address] [facility]    July 6, 2022       RE: Gabi Roland      To Whom It May Concern,    This is to certify that Gabi Roland was admitted into the hospital on 7/4/2022 and may return to work on 7/8/2022 with lighter duties until cleared by her family physician. Please feel free to contact my office at the hospital ([phone]) if you have any questions or concerns. Thank you for your assistance in this matter.     Sincerely,  Murtaza Christiansen, DO

## 2022-07-06 NOTE — PROGRESS NOTES
Discharge instructions reviewed with Patient. Prescriptions given for [employer] and med info sheets provided for all new medications. Opportunity for questions provided. Patient voiced understanding of all discharge instructions.

## 2022-07-06 NOTE — PLAN OF CARE
Problem: Safety - Adult  Goal: Free from fall injury  Outcome: Completed     Problem: Pain  Goal: Verbalizes/displays adequate comfort level or baseline comfort level  Outcome: Completed

## 2022-07-06 NOTE — PROGRESS NOTES
No new discharge needs identified. Pt was expected to discharge yesterday, however, is now c/o HA and CP. Likely discharge tomorrow.

## 2022-07-15 DIAGNOSIS — N93.9 VAGINAL BLEEDING: Primary | ICD-10-CM

## 2022-07-19 NOTE — PROGRESS NOTES
Date: 7/20/2022        Patient Name: Rafael Smith     YOB: 1970          Chief Complaint     Chief Complaint   Patient presents with    New Patient     Menometrorragia with anemia   Admit, lupron given   No bleeding since dc on lupron   Iv iron on admit    History of Present Illness   Rafael Smith is a 45 y. o. who presents today for scheduled visit    hx regular menses till last month. Noted continuous vaginal bleeding x one month, had arranged appt with new gyn, but not till aug. Er for what turned out to be symptomatic anemia     Hx sign reported normal pap 2020  btl with last pregnancy 2006     reletively recent start low dose asa     Surgery, btl, choly, thyroids  Past Medical History   No past medical history on file. Past Surgical History   No past surgical history on file. Medications      Current Outpatient Medications:     nitroGLYCERIN (NITROSTAT) 0.4 MG SL tablet, Place 1 tablet under the tongue every 5 minutes as needed for Chest pain up to max of 3 total doses. If no relief after 1 dose, call 911., Disp: 25 tablet, Rfl: 3    ferrous sulfate (IRON 325) 325 (65 Fe) MG tablet, Take 1 tablet by mouth 2 times daily, Disp: 60 tablet, Rfl: 0     Allergies   Patient has no known allergies. Social History     Social History       Tobacco History       Smoking Status  Never Assessed      Smokeless Tobacco Use  Unknown              Alcohol History       Alcohol Use Status  Not Asked              Drug Use       Drug Use Status  Not Asked              Sexual Activity       Sexually Active  Not Asked                    Family History   No family history on file. Review of Systems   Review of Systems   Constitutional: Negative. HENT: Negative. Eyes: Negative. Respiratory: Negative. Cardiovascular: Negative. Gastrointestinal: Negative. Endocrine: Negative. Genitourinary: Negative. Musculoskeletal: Negative. Skin: Negative. Allergic/Immunologic: Negative. Neurological: Negative. Hematological: Negative. Psychiatric/Behavioral: Negative. All other systems reviewed and are negative. Physical Exam     ECOG PERFORMANCE STATUS - 0-Fully active, able to carry on all pre-disease performance without restriction. Blood pressure 131/82, pulse 55, temperature 98.5 °F (36.9 °C), resp. rate 16, height 5' 4\" (1.626 m), weight 182 lb (82.6 kg), SpO2 100 %. Physical Exam  Vitals and nursing note reviewed. Exam conducted with a chaperone present. Constitutional:       Appearance: Normal appearance. She is normal weight. HENT:      Head: Normocephalic and atraumatic. Cardiovascular:      Rate and Rhythm: Normal rate and regular rhythm. Heart sounds: Normal heart sounds. Pulmonary:      Effort: Pulmonary effort is normal. No respiratory distress. Breath sounds: Normal breath sounds. Abdominal:      General: Abdomen is flat. Palpations: Abdomen is soft. Genitourinary:     General: Normal vulva. Comments: Pt consents for pap and endometrial biopsy  Pap done, cervix normal  Pipelle to 6 cm, some blood and tissue returned, well tolerated. Musculoskeletal:         General: Normal range of motion. Skin:     General: Skin is warm and dry. Neurological:      General: No focal deficit present. Mental Status: She is alert and oriented to person, place, and time. Psychiatric:         Mood and Affect: Mood normal.         Behavior: Behavior normal.         Thought Content: Thought content normal.         Judgment: Judgment normal.       Labs        Recent Results (from the past 48 hour(s))   Hemoglobin and Hematocrit    Collection Time: 07/20/22  9:53 AM   Result Value Ref Range    Hemoglobin 11.1 (L) 11.7 - 15.4 g/dL    Hematocrit 38.8 35.8 - 46.3 %        No results found for:      Pathology        Imaging/Diagnostics Last 24 Hours   No results found.     Radiology:    CT Result (most recent):  CT HEAD WO CONTRAST 07/06/2022    Narrative  CT HEAD WITHOUT CONTRAST. INDICATION: Headache and visual disturbance. COMPARISON: None. TECHNIQUE:   5 mm axial scans from the skull base to the vertex. Our CT  scanners use one or more of the following:  Automated exposure control,  adjustment of the mA and or kV according to patient size, iterative  reconstruction. FINDINGS:  No acute intraparenchymal hemorrhage or abnormal extra-axial fluid collection. The ventricles are normal size. No midline shift or mass effect. Posterior fossa: Unremarkable. Included portion of the:  Paranasal sinuses and mastoid air cells grossly clear. Globes and orbits grossly unremarkable. Impression  Negative for acute intracranial abnormality. PET Results (most recent):  No results found for this or any previous visit from the past 365 days. Mammo Results (most recent):  No results found for this or any previous visit from the past 365 days. US Result (most recent):  US PELVIS COMPLETE 07/04/2022    Narrative  PELVIC ULTRASOUND. HISTORY:  Low abdomen pain and abnormal bleeding    TECHNIQUE:  Transabdominal and endovaginal  images were obtained of the pelvis. FINDINGS:  - UTERUS:  11.4 cm in length. Endometrial stripe measures 5 mm. There is  heterogeneity of the myometrium. No discrete myometrial or endometrial mass is  seen. Neither ovary is identified. No definite adnexal mass. No significant free  fluid. Impression  1. Heterogeneous appearance of the myometrium. No discrete mass identified. 2.  Nonvisualization of both ovaries. Assessment       Diagnosis Orders   1. Vaginal bleeding  PAP LB, Reflex HPV ASCUS        Specialty Problems    None      Plan   1.tu next week to review bx and pap, discussed ongoing tx options  Cont oral meds.                 Rowena Toscano MD  [place] [address]  Office : [phone]  Fax : [phone]

## 2022-07-20 ENCOUNTER — HOSPITAL ENCOUNTER (OUTPATIENT)
Dept: LAB | Age: 52
Discharge: HOME | End: 2022-07-23
Payer: COMMERCIAL

## 2022-07-20 ENCOUNTER — OFFICE VISIT (OUTPATIENT)
Dept: ONCOLOGY | Age: 52
End: 2022-07-20
Payer: COMMERCIAL

## 2022-07-20 VITALS
DIASTOLIC BLOOD PRESSURE: 82 MMHG | HEART RATE: 55 BPM | HEIGHT: 64 IN | TEMPERATURE: 98.5 F | SYSTOLIC BLOOD PRESSURE: 131 MMHG | OXYGEN SATURATION: 100 % | WEIGHT: 182 LBS | RESPIRATION RATE: 16 BRPM | BODY MASS INDEX: 31.07 KG/M2

## 2022-07-20 DIAGNOSIS — N93.9 VAGINAL BLEEDING: ICD-10-CM

## 2022-07-20 DIAGNOSIS — N93.9 VAGINAL BLEEDING: Primary | ICD-10-CM

## 2022-07-20 DIAGNOSIS — N80.03 ADENOMYOSIS OF UTERUS: ICD-10-CM

## 2022-07-20 LAB
HCT VFR BLD AUTO: 38.8 % (ref 35.8–46.3)
HGB BLD-MCNC: 11.1 G/DL (ref 11.7–15.4)

## 2022-07-20 PROCEDURE — 99212 OFFICE O/P EST SF 10 MIN: CPT | Performed by: OBSTETRICS & GYNECOLOGY

## 2022-07-20 PROCEDURE — 88142 CYTOPATH C/V THIN LAYER: CPT

## 2022-07-20 PROCEDURE — 88305 TISSUE EXAM BY PATHOLOGIST: CPT

## 2022-07-20 PROCEDURE — 85014 HEMATOCRIT: CPT

## 2022-07-20 PROCEDURE — 58100 BIOPSY OF UTERUS LINING: CPT | Performed by: OBSTETRICS & GYNECOLOGY

## 2022-07-20 PROCEDURE — 36415 COLL VENOUS BLD VENIPUNCTURE: CPT

## 2022-07-20 ASSESSMENT — ENCOUNTER SYMPTOMS
RESPIRATORY NEGATIVE: 1
ALLERGIC/IMMUNOLOGIC NEGATIVE: 1
EYES NEGATIVE: 1
GASTROINTESTINAL NEGATIVE: 1

## 2022-07-20 ASSESSMENT — PATIENT HEALTH QUESTIONNAIRE - PHQ9
SUM OF ALL RESPONSES TO PHQ QUESTIONS 1-9: 0
2. FEELING DOWN, DEPRESSED OR HOPELESS: 0
SUM OF ALL RESPONSES TO PHQ QUESTIONS 1-9: 0

## 2022-07-26 NOTE — PROGRESS NOTES
Bx discussed, pt reports continued bleeding  Options reviewd in detail  Iud  D and c with ablation  Hyst  Rba of each discussed    Will proceed with d and c with ablation    Name:    Jackie Goldstein   Accession Number:   [id number]   MR #   [id number]   Date Obtained:   7/20/2022   DIAGNOSIS        A:  \" ENDOMETRIAL BIOPSY\":         FRAGMENTS OF NON-SECRETORY ENDOMETRIUM           Sign Out Date: 7/21/2022  VERONICA Marie M.D. Name:    Jackie Goldstein   Accession Number:   [id number]   MR #   [id number]   Date Obtained:   7/20/2022   DIAGNOSIS   Cervical, Thin Prep Pap Test:     Satisfactory for evaluation. No endocervical/transformation zone   component cells identified. NEGATIVE FOR INTRAEPITHELIAL LESION OR MALIGNANCY     See concurrent case [id number].       on   7/21/2022 14:45 by

## 2022-07-27 ENCOUNTER — HOSPITAL ENCOUNTER (OUTPATIENT)
Dept: LAB | Age: 52
Discharge: HOME | End: 2022-07-30
Payer: COMMERCIAL

## 2022-07-27 ENCOUNTER — OFFICE VISIT (OUTPATIENT)
Dept: ONCOLOGY | Age: 52
End: 2022-07-27

## 2022-07-27 VITALS
RESPIRATION RATE: 14 BRPM | SYSTOLIC BLOOD PRESSURE: 131 MMHG | HEIGHT: 64 IN | WEIGHT: 191.3 LBS | HEART RATE: 82 BPM | DIASTOLIC BLOOD PRESSURE: 91 MMHG | OXYGEN SATURATION: 100 % | BODY MASS INDEX: 32.66 KG/M2 | TEMPERATURE: 98.2 F

## 2022-07-27 DIAGNOSIS — N80.03 ADENOMYOSIS OF UTERUS: Primary | ICD-10-CM

## 2022-07-27 DIAGNOSIS — N93.9 VAGINAL BLEEDING: ICD-10-CM

## 2022-07-27 DIAGNOSIS — N80.03 ADENOMYOSIS OF UTERUS: ICD-10-CM

## 2022-07-27 LAB
BASOPHILS NFR BLD: 1 % (ref 0–2)
DIFFERENTIAL METHOD BLD: ABNORMAL
EOSINOPHIL NFR BLD: 0 % (ref 0.5–7.8)
HCT VFR BLD AUTO: 40.8 % (ref 35.8–46.3)
HGB BLD-MCNC: 12 G/DL (ref 11.7–15.4)
IMM GRANULOCYTES NFR BLD AUTO: 0 % (ref 0–5)
LYMPHOCYTES NFR BLD: 16 % (ref 13–44)
MCH RBC QN AUTO: 22.5 PG (ref 26.1–32.9)
MCHC RBC AUTO-ENTMCNC: 29.4 G/DL (ref 31.4–35)
MCV RBC AUTO: 76.4 FL (ref 79.6–97.8)
MONOCYTES NFR BLD: 5 % (ref 4–12)
NEUTS SEG NFR BLD: 78 % (ref 43–78)
NRBC # BLD: 0 K/UL (ref 0–0.2)
PLATELET # BLD AUTO: 308 K/UL (ref 150–450)
PLATELET COMMENT: ADEQUATE
PMV BLD AUTO: 9.8 FL (ref 9.4–12.3)
RBC # BLD AUTO: 5.34 M/UL (ref 4.05–5.2)
RBC MORPH BLD: ABNORMAL
WBC # BLD AUTO: 11.4 K/UL (ref 4.3–11.1)
WBC MORPH BLD: ABNORMAL

## 2022-07-27 PROCEDURE — 36415 COLL VENOUS BLD VENIPUNCTURE: CPT

## 2022-07-27 PROCEDURE — 99024 POSTOP FOLLOW-UP VISIT: CPT | Performed by: OBSTETRICS & GYNECOLOGY

## 2022-07-27 PROCEDURE — 85025 COMPLETE CBC W/AUTO DIFF WBC: CPT

## 2022-07-27 ASSESSMENT — PATIENT HEALTH QUESTIONNAIRE - PHQ9
SUM OF ALL RESPONSES TO PHQ QUESTIONS 1-9: 0
SUM OF ALL RESPONSES TO PHQ QUESTIONS 1-9: 0
1. LITTLE INTEREST OR PLEASURE IN DOING THINGS: 0
2. FEELING DOWN, DEPRESSED OR HOPELESS: 0
SUM OF ALL RESPONSES TO PHQ QUESTIONS 1-9: 0
SUM OF ALL RESPONSES TO PHQ QUESTIONS 1-9: 0
SUM OF ALL RESPONSES TO PHQ9 QUESTIONS 1 & 2: 0

## 2022-07-27 NOTE — PATIENT INSTRUCTIONS
Patient Instructions from Today's Visit    Reason for Visit:  Follow up     Diagnosis Information:  [email or web address]      Plan: Your biopsy is benign, no signs of cancer. Your recent ultrasound does show adenomyosis which could be causing the bleeding. Options are to have a hysterectomy, an IUD or a repeat injection of the Lupron, however that is not meant to be on permanently. You can also have a D&C and ablation. This \"burns\" the lining of your uterus and potentially stop the bleeding. Follow Up: We will get  you scheduled for a D&C in the next few weeks. Please expect a phone call     Recent Lab Results:      Treatment Summary has been discussed and given to patient:         -------------------------------------------------------------------------------------------------------------------    Patient does express an interest in My Chart. My Chart log in information explained on the after visit summary printout at the [place] [facility] desk.     JOLENE Ryan

## 2022-08-01 ENCOUNTER — OFFICE VISIT (OUTPATIENT)
Dept: CARDIOLOGY CLINIC | Age: 52
End: 2022-08-01
Payer: COMMERCIAL

## 2022-08-01 VITALS
HEART RATE: 64 BPM | HEIGHT: 64 IN | DIASTOLIC BLOOD PRESSURE: 82 MMHG | WEIGHT: 192.2 LBS | SYSTOLIC BLOOD PRESSURE: 134 MMHG | BODY MASS INDEX: 32.81 KG/M2

## 2022-08-01 DIAGNOSIS — R07.89 CHEST DISCOMFORT: Primary | ICD-10-CM

## 2022-08-01 DIAGNOSIS — Z01.818 PRE-OP EVALUATION: ICD-10-CM

## 2022-08-01 DIAGNOSIS — R00.2 PALPITATIONS: ICD-10-CM

## 2022-08-01 DIAGNOSIS — D50.0 IRON DEFICIENCY ANEMIA DUE TO CHRONIC BLOOD LOSS: ICD-10-CM

## 2022-08-01 PROCEDURE — 99204 OFFICE O/P NEW MOD 45 MIN: CPT | Performed by: INTERNAL MEDICINE

## 2022-08-01 NOTE — PATIENT INSTRUCTIONS
Patient Education        Shortness of Breath: Care Instructions  Your Care Instructions     Shortness of breath has many causes. Sometimes conditions such as anxiety can lead to shortness of breath. Some people get mild shortness of breath when they exercise. Trouble breathing also can be a symptom of a serious problem, such asasthma, lung disease, emphysema, heart problems, and pneumonia. If your shortness of breath continues, you may need tests and treatment. Watchfor any changes in your breathing and other symptoms. Follow-up care is a key part of your treatment and safety. Be sure to make and go to all appointments, and call your doctor if you are having problems. It's also a good idea to know your test results and keep alist of the medicines you take. How can you care for yourself at home? Do not smoke or allow others to smoke around you. If you need help quitting, talk to your doctor about stop-smoking programs and medicines. These can increase your chances of quitting for good. Get plenty of rest and sleep. Take your medicines exactly as prescribed. Call your doctor if you think you are having a problem with your medicine. Find healthy ways to deal with stress. Exercise daily. Get plenty of sleep. Eat regularly and well. When should you call for help? Call 911 anytime you think you may need emergency care. For example, call if:    You have severe shortness of breath. You have symptoms of a heart attack. These may include:  Chest pain or pressure, or a strange feeling in the chest.  Sweating. Shortness of breath. Nausea or vomiting. Pain, pressure, or a strange feeling in the back, neck, jaw, or upper belly or in one or both shoulders or arms. Lightheadedness or sudden weakness. A fast or irregular heartbeat. After you call 911, the  may tell you to chew 1 adult-strength or 2 to 4 low-dose aspirin. Wait for an ambulance. Do not try to drive yourself.    Call your doctor now or seek immediate medical care if:    Your shortness of breath gets worse or you start to wheeze. Wheezing is a high-pitched sound when you breathe. You wake up at night out of breath or have to prop your head up on several pillows to breathe. You are short of breath after only light activity or while at rest.   Watch closely for changes in your health, and be sure to contact your doctor if:    You do not get better over the next 1 to 2 days. Where can you learn more? Go to https://Verafin.ICVRx. org and sign in to your [employer] account. Enter S780 in the [place] box to learn more about \"Shortness of Breath: Care Instructions. \"     If you do not have an account, please click on the \"Sign Up Now\" link. Current as of: March 9, 2022               Content Version: 13.3  © 0561-8201 Healthwise, Incorporated. Care instructions adapted under license by [facility] ([facility]). If you have questions about a medical condition or this instruction, always ask your healthcare professional. [address] any warranty or liability for your use of this information.

## 2022-08-01 NOTE — PROGRESS NOTES
[facility] CARDIOLOGY  [address], [address]  PHONE: [phone]      22    NAME:  Rafael Smith  : 1970  MRN: [id number]         SUBJECTIVE:   Rafael Smith is a 45 y.o. female seen for a consultation visit regarding the following:     Chief Complaint   Patient presents with    New Patient    Follow-Up from Hospital              HPI:  Consultation is requested by [facility] for evaluation of New Patient and Follow-Up from Hospital   .    ER requests consultation for atypical chest pain. Patient was seen there 22 with weakness/dizziness, found to have Hgb 6.5, transfused 1 unit PRBC and IV iron, d/t uterine fibroids with bleeding and plans for D and C with endometrial ablation. Biopsy negative for malignancy    She still feels badly. She describes a sharp pain in the chest and arm, may last up to a minute. Better with aspirin but stopped taking it since she was bleeding. No regular exercise. Works in a nursing home, is on her feet constantly, moving. She's chosen a primary care provider in [place] but doesn't know name. States she had complete lab work including thyroid also in [place] but can't recall where. States she was told many years ago she had mitral prolapse. Past Medical History, Past Surgical History, Family history, Social History, and Medications were all reviewed with the patient today and updated as necessary. Prior to Admission medications    Medication Sig Start Date End Date Taking? Authorizing Provider   nitroGLYCERIN (NITROSTAT) 0.4 MG SL tablet Place 1 tablet under the tongue every 5 minutes as needed for Chest pain up to max of 3 total doses.  If no relief after 1 dose, call 911. 22  Yes Sapphire Vivas, DO   ferrous sulfate (IRON 325) 325 (65 Fe) MG tablet Take 1 tablet by mouth 2 times daily 22  Yes SAMM Wilkerson     No Known Allergies  Past Medical History:   Diagnosis Date    Hyperlipidemia     Iron deficiency anemia due to chronic blood loss      Past Surgical History:   Procedure Laterality Date    CHOLECYSTECTOMY      THYROIDECTOMY       Family History   Problem Relation Age of Onset    Thyroid Cancer Mother         and pancreatic cancer    Heart Disease Father         36's-bypass surgery    Heart Disease Sister      Social History     Tobacco Use    Smoking status: Never    Smokeless tobacco: Never   Substance Use Topics    Alcohol use: Yes     Alcohol/week: 1.0 standard drink     Types: 1 Glasses of wine per week       ROS:    Review of Systems   Constitutional: Positive for malaise/fatigue. Cardiovascular:  Positive for palpitations. PHYSICAL EXAM:   /82   Pulse 64   Ht 5' 4\" (1.626 m)   Wt 192 lb 3.2 oz (87.2 kg)   LMP  (LMP Unknown) Comment: unsure since bx has been on and off spotting, disharge then bleeding  BMI 32.99 kg/m²      Physical Exam  Constitutional:       Appearance: Normal appearance. HENT:      Head: Normocephalic and atraumatic. Eyes:      Conjunctiva/sclera: Conjunctivae normal.   Neck:      Vascular: No carotid bruit. Cardiovascular:      Rate and Rhythm: Normal rate and regular rhythm. Heart sounds: No midsystolic click. Murmur heard. Blowing early systolic murmur is present with a grade of 2/6 at the upper left sternal border and lower left sternal border. No friction rub. No gallop. Pulmonary:      Effort: No respiratory distress. Breath sounds: No wheezing or rales. Abdominal:      General: Abdomen is flat. There is no distension. Palpations: Abdomen is soft. Tenderness: There is no abdominal tenderness. Musculoskeletal:         General: No swelling. Cervical back: Neck supple. Skin:     General: Skin is warm and dry. Neurological:      General: No focal deficit present. Mental Status: She is alert.    Psychiatric:         Mood and Affect: Mood normal.         Behavior: Behavior normal.       Medical problems and test results were reviewed with the patient today. DATA REVIEW    BMP  Lab Results   Component Value Date/Time     07/05/2022 07:42 AM    K 3.8 07/05/2022 07:42 AM     07/05/2022 07:42 AM    CO2 27 07/05/2022 07:42 AM    BUN 9 07/05/2022 07:42 AM    CREATININE 0.70 07/05/2022 07:42 AM    GLUCOSE 93 07/05/2022 07:42 AM    CALCIUM 9.2 07/05/2022 07:42 AM      Lab Results   Component Value Date    WBC 11.4 (H) 07/27/2022    HGB 12.0 07/27/2022    HCT 40.8 07/27/2022    MCV 76.4 (L) 07/27/2022     07/27/2022         EKG    7/4/22 - reviewed - normal tracing    CXR/IMAGING        DEVICE INTERROGATION        OUTSIDE RECORDS REVIEW    Records from outside providers have been reviewed and summarized as noted in the HPI, past history and data review sections of this note, and reviewed with patient. .       ASSESSMENT and Amanda Naranjo was seen today for new patient and follow-up from hospital.    Diagnoses and all orders for this visit:    Chest discomfort    Iron deficiency anemia due to chronic blood loss    Pre-op evaluation    Palpitations  -     Transthoracic echocardiogram (TTE) complete with contrast, bubble, strain, and 3D PRN; Future        IMPRESSION:    Low risk to proceed with D and C/ablation as planned    Suspect the majority of symptoms are related to iron deficiency which is being addressed. Does report a possible history of MVP which is not apparent on exam, echo to clarify    Request labs from provider in [place], having reported high cholesterol    Return for AFTER PROCEDURE TO REVIEW RESULTS. Thank you for allowing me to participate in this patient's care. Please call or contact me if there are any questions or concerns regarding the above.       Talat Quintanilla MD  08/01/22  10:25 AM

## 2022-08-04 ENCOUNTER — TELEPHONE (OUTPATIENT)
Dept: ONCOLOGY | Age: 52
End: 2022-08-04

## 2022-08-05 ENCOUNTER — PREP FOR PROCEDURE (OUTPATIENT)
Dept: ONCOLOGY | Age: 52
End: 2022-08-05

## 2022-08-23 PROBLEM — R06.02 SHORTNESS OF BREATH: Status: ACTIVE | Noted: 2022-08-23

## 2022-08-25 RX ORDER — DIPHENHYDRAMINE HCL 25 MG
25 CAPSULE ORAL EVERY 6 HOURS PRN
COMMUNITY

## 2022-08-25 NOTE — PRE-PROCEDURE INSTRUCTIONS
Patient verified name and . Order for consent was not found in EHR a; patient verifies procedure. Type IB surgery, phone assessment complete. Orders were not received. Labs per surgeon: none. Labs per anesthesia protocol: POC Hgb. Patient answered medical/surgical history questions at their best of ability. All prior to admission medications documented in [facility]. Patient instructed to take the following medications the day of surgery according to anesthesia guidelines with a small sip of water: none . Hold all vitamins 7 days prior to surgery and NSAIDS 5 days prior to surgery. Prescription meds to hold:none  Patient instructed on the following:    > Arrive at main Entrance, time of arrival to be called the day before by 1700  > NPO after midnight, unless otherwise indicated, including gum, mints, and ice chips  > Responsible adult must drive patient to the hospital, stay during surgery, and patient will need supervision 24 hours after anesthesia  > Use antibacterial soap in shower the night before surgery and on the morning of surgery  > All piercings must be removed prior to arrival.    > Leave all valuables (money and jewelry) at home but bring insurance card and ID on DOS.   > You may be required to pay a deductible or co-pay on the day of your procedure. You can pre-pay by calling [phone] if your surgery is at the [facility] or [phone] if your surgery is at the [facility]. > Do not wear make-up, nail polish, lotions, cologne, perfumes, powders, or oil on skin. Artificial nails are not permitted.

## 2022-08-26 ENCOUNTER — PREP FOR PROCEDURE (OUTPATIENT)
Dept: ENT CLINIC | Age: 52
End: 2022-08-26

## 2022-08-26 RX ORDER — SODIUM CHLORIDE 0.9 % (FLUSH) 0.9 %
5-40 SYRINGE (ML) INJECTION PRN
Status: CANCELLED | OUTPATIENT
Start: 2022-08-26

## 2022-08-26 RX ORDER — SODIUM CHLORIDE 0.9 % (FLUSH) 0.9 %
5-40 SYRINGE (ML) INJECTION EVERY 12 HOURS SCHEDULED
Status: CANCELLED | OUTPATIENT
Start: 2022-08-26

## 2022-08-26 RX ORDER — SODIUM CHLORIDE 9 MG/ML
INJECTION, SOLUTION INTRAVENOUS PRN
Status: CANCELLED | OUTPATIENT
Start: 2022-08-26

## 2022-08-29 NOTE — PERIOP NOTE
Directly informed patient and or family member of pre op arrival time 56 on 8/30. All questions answered. Pre op instructions reviewed. Left contact information for any additional questions or needs.

## 2022-08-30 ENCOUNTER — ANESTHESIA EVENT (OUTPATIENT)
Dept: SURGERY | Age: 52
End: 2022-08-30
Payer: COMMERCIAL

## 2022-08-30 ENCOUNTER — HOSPITAL ENCOUNTER (OUTPATIENT)
Age: 52
Setting detail: OUTPATIENT SURGERY
Discharge: HOME | End: 2022-08-30
Attending: OBSTETRICS & GYNECOLOGY | Admitting: OBSTETRICS & GYNECOLOGY
Payer: COMMERCIAL

## 2022-08-30 ENCOUNTER — ANESTHESIA (OUTPATIENT)
Dept: SURGERY | Age: 52
End: 2022-08-30
Payer: COMMERCIAL

## 2022-08-30 VITALS
HEIGHT: 64 IN | OXYGEN SATURATION: 99 % | HEART RATE: 60 BPM | WEIGHT: 191.4 LBS | TEMPERATURE: 97.6 F | BODY MASS INDEX: 32.68 KG/M2 | SYSTOLIC BLOOD PRESSURE: 132 MMHG | RESPIRATION RATE: 17 BRPM | DIASTOLIC BLOOD PRESSURE: 81 MMHG

## 2022-08-30 LAB
BASOPHILS # BLD: 0.1 K/UL (ref 0–0.2)
BASOPHILS NFR BLD: 1 % (ref 0–2)
DIFFERENTIAL METHOD BLD: ABNORMAL
EOSINOPHIL # BLD: 0.1 K/UL (ref 0–0.8)
EOSINOPHIL NFR BLD: 1 % (ref 0.5–7.8)
GLUCOSE BLD STRIP.AUTO-MCNC: 105 MG/DL (ref 65–100)
HCT VFR BLD AUTO: 43.8 % (ref 35.8–46.3)
HGB BLD-MCNC: 13.4 G/DL (ref 11.7–15.4)
IMM GRANULOCYTES # BLD AUTO: 0.1 K/UL (ref 0–0.5)
IMM GRANULOCYTES NFR BLD AUTO: 1 % (ref 0–5)
LYMPHOCYTES # BLD: 2 K/UL (ref 0.5–4.6)
LYMPHOCYTES NFR BLD: 25 % (ref 13–44)
MCH RBC QN AUTO: 25.5 PG (ref 26.1–32.9)
MCHC RBC AUTO-ENTMCNC: 30.6 G/DL (ref 31.4–35)
MCV RBC AUTO: 83.3 FL (ref 79.6–97.8)
MONOCYTES # BLD: 0.6 K/UL (ref 0.1–1.3)
MONOCYTES NFR BLD: 8 % (ref 4–12)
NEUTS SEG # BLD: 4.9 K/UL (ref 1.7–8.2)
NEUTS SEG NFR BLD: 64 % (ref 43–78)
NRBC # BLD: 0 K/UL (ref 0–0.2)
PLATELET # BLD AUTO: 105 K/UL (ref 150–450)
PLATELET COMMENT: ADEQUATE
PMV BLD AUTO: ABNORMAL FL (ref 9.4–12.3)
RBC # BLD AUTO: 5.26 M/UL (ref 4.05–5.2)
RBC MORPH BLD: ABNORMAL
SERVICE CMNT-IMP: ABNORMAL
WBC # BLD AUTO: 7.8 K/UL (ref 4.3–11.1)
WBC MORPH BLD: ABNORMAL

## 2022-08-30 PROCEDURE — 3700000001 HC ADD 15 MINUTES (ANESTHESIA): Performed by: OBSTETRICS & GYNECOLOGY

## 2022-08-30 PROCEDURE — 3700000000 HC ANESTHESIA ATTENDED CARE: Performed by: OBSTETRICS & GYNECOLOGY

## 2022-08-30 PROCEDURE — 6360000002 HC RX W HCPCS: Performed by: NURSE ANESTHETIST, CERTIFIED REGISTERED

## 2022-08-30 PROCEDURE — 7100000000 HC PACU RECOVERY - FIRST 15 MIN: Performed by: OBSTETRICS & GYNECOLOGY

## 2022-08-30 PROCEDURE — C1713 ANCHOR/SCREW BN/BN,TIS/BN: HCPCS | Performed by: OBSTETRICS & GYNECOLOGY

## 2022-08-30 PROCEDURE — 82962 GLUCOSE BLOOD TEST: CPT

## 2022-08-30 PROCEDURE — 2709999900 HC NON-CHARGEABLE SUPPLY: Performed by: OBSTETRICS & GYNECOLOGY

## 2022-08-30 PROCEDURE — 3600000013 HC SURGERY LEVEL 3 ADDTL 15MIN: Performed by: OBSTETRICS & GYNECOLOGY

## 2022-08-30 PROCEDURE — 3600000003 HC SURGERY LEVEL 3 BASE: Performed by: OBSTETRICS & GYNECOLOGY

## 2022-08-30 PROCEDURE — 88305 TISSUE EXAM BY PATHOLOGIST: CPT

## 2022-08-30 PROCEDURE — 6360000002 HC RX W HCPCS: Performed by: ANESTHESIOLOGY

## 2022-08-30 PROCEDURE — 2580000003 HC RX 258: Performed by: ANESTHESIOLOGY

## 2022-08-30 PROCEDURE — 7100000001 HC PACU RECOVERY - ADDTL 15 MIN: Performed by: OBSTETRICS & GYNECOLOGY

## 2022-08-30 PROCEDURE — 85025 COMPLETE CBC W/AUTO DIFF WBC: CPT

## 2022-08-30 PROCEDURE — 7100000010 HC PHASE II RECOVERY - FIRST 15 MIN: Performed by: OBSTETRICS & GYNECOLOGY

## 2022-08-30 PROCEDURE — 2500000003 HC RX 250 WO HCPCS: Performed by: NURSE ANESTHETIST, CERTIFIED REGISTERED

## 2022-08-30 RX ORDER — ONDANSETRON 4 MG/1
4 TABLET, FILM COATED ORAL DAILY PRN
Qty: 30 TABLET | Refills: 0 | Status: SHIPPED | OUTPATIENT
Start: 2022-08-30

## 2022-08-30 RX ORDER — MIDAZOLAM HYDROCHLORIDE 2 MG/2ML
2 INJECTION, SOLUTION INTRAMUSCULAR; INTRAVENOUS
Status: COMPLETED | OUTPATIENT
Start: 2022-08-30 | End: 2022-08-30

## 2022-08-30 RX ORDER — CEFAZOLIN SODIUM 1 G/3ML
INJECTION, POWDER, FOR SOLUTION INTRAMUSCULAR; INTRAVENOUS PRN
Status: DISCONTINUED | OUTPATIENT
Start: 2022-08-30 | End: 2022-08-30 | Stop reason: SDUPTHER

## 2022-08-30 RX ORDER — PROCHLORPERAZINE EDISYLATE 5 MG/ML
5 INJECTION INTRAMUSCULAR; INTRAVENOUS
Status: DISCONTINUED | OUTPATIENT
Start: 2022-08-30 | End: 2022-08-30 | Stop reason: HOSPADM

## 2022-08-30 RX ORDER — FENTANYL CITRATE 50 UG/ML
100 INJECTION, SOLUTION INTRAMUSCULAR; INTRAVENOUS
Status: DISCONTINUED | OUTPATIENT
Start: 2022-08-30 | End: 2022-08-30 | Stop reason: HOSPADM

## 2022-08-30 RX ORDER — DEXAMETHASONE SODIUM PHOSPHATE 4 MG/ML
INJECTION, SOLUTION INTRA-ARTICULAR; INTRALESIONAL; INTRAMUSCULAR; INTRAVENOUS; SOFT TISSUE PRN
Status: DISCONTINUED | OUTPATIENT
Start: 2022-08-30 | End: 2022-08-30 | Stop reason: SDUPTHER

## 2022-08-30 RX ORDER — LIDOCAINE HYDROCHLORIDE 20 MG/ML
INJECTION, SOLUTION EPIDURAL; INFILTRATION; INTRACAUDAL; PERINEURAL PRN
Status: DISCONTINUED | OUTPATIENT
Start: 2022-08-30 | End: 2022-08-30 | Stop reason: SDUPTHER

## 2022-08-30 RX ORDER — HYDROMORPHONE HYDROCHLORIDE 2 MG/ML
0.5 INJECTION, SOLUTION INTRAMUSCULAR; INTRAVENOUS; SUBCUTANEOUS EVERY 5 MIN PRN
Status: DISCONTINUED | OUTPATIENT
Start: 2022-08-30 | End: 2022-08-30 | Stop reason: HOSPADM

## 2022-08-30 RX ORDER — OXYCODONE HYDROCHLORIDE 5 MG/1
5 TABLET ORAL
Status: DISCONTINUED | OUTPATIENT
Start: 2022-08-30 | End: 2022-08-30 | Stop reason: HOSPADM

## 2022-08-30 RX ORDER — SODIUM CHLORIDE, SODIUM LACTATE, POTASSIUM CHLORIDE, CALCIUM CHLORIDE 600; 310; 30; 20 MG/100ML; MG/100ML; MG/100ML; MG/100ML
100 INJECTION, SOLUTION INTRAVENOUS CONTINUOUS
Status: DISCONTINUED | OUTPATIENT
Start: 2022-08-30 | End: 2022-08-30 | Stop reason: HOSPADM

## 2022-08-30 RX ORDER — ONDANSETRON 2 MG/ML
INJECTION INTRAMUSCULAR; INTRAVENOUS PRN
Status: DISCONTINUED | OUTPATIENT
Start: 2022-08-30 | End: 2022-08-30 | Stop reason: SDUPTHER

## 2022-08-30 RX ORDER — PROPOFOL 10 MG/ML
INJECTION, EMULSION INTRAVENOUS PRN
Status: DISCONTINUED | OUTPATIENT
Start: 2022-08-30 | End: 2022-08-30 | Stop reason: SDUPTHER

## 2022-08-30 RX ORDER — FENTANYL CITRATE 50 UG/ML
INJECTION, SOLUTION INTRAMUSCULAR; INTRAVENOUS PRN
Status: DISCONTINUED | OUTPATIENT
Start: 2022-08-30 | End: 2022-08-30 | Stop reason: SDUPTHER

## 2022-08-30 RX ORDER — SODIUM CHLORIDE 9 MG/ML
INJECTION, SOLUTION INTRAVENOUS PRN
Status: DISCONTINUED | OUTPATIENT
Start: 2022-08-30 | End: 2022-08-30 | Stop reason: HOSPADM

## 2022-08-30 RX ORDER — KETOROLAC TROMETHAMINE 30 MG/ML
INJECTION, SOLUTION INTRAMUSCULAR; INTRAVENOUS PRN
Status: DISCONTINUED | OUTPATIENT
Start: 2022-08-30 | End: 2022-08-30 | Stop reason: SDUPTHER

## 2022-08-30 RX ORDER — SODIUM CHLORIDE 0.9 % (FLUSH) 0.9 %
5-40 SYRINGE (ML) INJECTION EVERY 12 HOURS SCHEDULED
Status: DISCONTINUED | OUTPATIENT
Start: 2022-08-30 | End: 2022-08-30 | Stop reason: HOSPADM

## 2022-08-30 RX ORDER — LIDOCAINE HYDROCHLORIDE 10 MG/ML
1 INJECTION, SOLUTION INFILTRATION; PERINEURAL
Status: DISCONTINUED | OUTPATIENT
Start: 2022-08-30 | End: 2022-08-30 | Stop reason: HOSPADM

## 2022-08-30 RX ORDER — SODIUM CHLORIDE 0.9 % (FLUSH) 0.9 %
5-40 SYRINGE (ML) INJECTION PRN
Status: DISCONTINUED | OUTPATIENT
Start: 2022-08-30 | End: 2022-08-30 | Stop reason: HOSPADM

## 2022-08-30 RX ADMIN — LIDOCAINE HYDROCHLORIDE 100 MG: 20 INJECTION, SOLUTION EPIDURAL; INFILTRATION; INTRACAUDAL; PERINEURAL at 08:15

## 2022-08-30 RX ADMIN — PROPOFOL 200 MG: 10 INJECTION, EMULSION INTRAVENOUS at 08:15

## 2022-08-30 RX ADMIN — SODIUM CHLORIDE, POTASSIUM CHLORIDE, SODIUM LACTATE AND CALCIUM CHLORIDE 100 ML/HR: 600; 310; 30; 20 INJECTION, SOLUTION INTRAVENOUS at 07:55

## 2022-08-30 RX ADMIN — KETOROLAC TROMETHAMINE 15 MG: 30 INJECTION, SOLUTION INTRAMUSCULAR; INTRAVENOUS at 08:47

## 2022-08-30 RX ADMIN — MIDAZOLAM HYDROCHLORIDE 2 MG: 1 INJECTION, SOLUTION INTRAMUSCULAR; INTRAVENOUS at 07:54

## 2022-08-30 RX ADMIN — ONDANSETRON 4 MG: 2 INJECTION INTRAMUSCULAR; INTRAVENOUS at 08:31

## 2022-08-30 RX ADMIN — FENTANYL CITRATE 50 MCG: 50 INJECTION, SOLUTION INTRAMUSCULAR; INTRAVENOUS at 08:22

## 2022-08-30 RX ADMIN — FENTANYL CITRATE 50 MCG: 50 INJECTION, SOLUTION INTRAMUSCULAR; INTRAVENOUS at 08:15

## 2022-08-30 RX ADMIN — DEXAMETHASONE SODIUM PHOSPHATE 4 MG: 4 INJECTION, SOLUTION INTRAMUSCULAR; INTRAVENOUS at 08:31

## 2022-08-30 RX ADMIN — CEFAZOLIN SODIUM 2000 MG: 1 INJECTION, POWDER, FOR SOLUTION INTRAMUSCULAR; INTRAVENOUS at 08:20

## 2022-08-30 ASSESSMENT — ENCOUNTER SYMPTOMS
GASTROINTESTINAL NEGATIVE: 1
ALLERGIC/IMMUNOLOGIC NEGATIVE: 1
EYES NEGATIVE: 1
RESPIRATORY NEGATIVE: 1

## 2022-08-30 ASSESSMENT — PAIN SCALES - GENERAL
PAINLEVEL_OUTOF10: 0
PAINLEVEL_OUTOF10: 0

## 2022-08-30 ASSESSMENT — PAIN - FUNCTIONAL ASSESSMENT: PAIN_FUNCTIONAL_ASSESSMENT: 0-10

## 2022-08-30 NOTE — DISCHARGE INSTRUCTIONS
Dilation and Curettage (D&C): What to Expect at Home  Your Recovery  Dilation and curettage (D&C) is a procedure to remove tissue from the inside of the uterus. The doctor used a curved tool, called a curette, to gently scrape tissue from your uterus. You are likely to have a backache, or cramps similar to menstrual cramps, and pass small clots of blood from your vagina for the first few days. You may continue to have light vaginal bleeding for several weeks after the procedure. You will probably be able to go back to most of your normal activities in 1 or 2 days. This care sheet gives you a general idea about how long it will take for you to recover. But each person recovers at a different pace. Follow the steps below to get better as quickly as possible. How can you care for yourself at home? Activity  Rest when you feel tired. Getting enough sleep will help you recover. Avoid strenuous activities, such as bicycle riding, jogging, weight lifting, or aerobic exercise, until your doctor says it is okay. Most women are able to return to work the day after the procedure. You may have some light vaginal bleeding. Wear sanitary pads if needed. Do not douche or use tampons for 2 weeks or until your doctor says it is okay. Ask your doctor when it is okay for you to have sex. If you could become pregnant, talk about birth control with your doctor. Do not try to become pregnant until your doctor says it is okay. Diet  You can eat your normal diet. If your stomach is upset, try bland, low-fat foods like plain rice, broiled chicken, toast, and yogurt. Drink plenty of fluids (unless your doctor tells you not to). Medicines  Take pain medicines exactly as directed. If the doctor gave you a prescription medicine for pain, take it as prescribed. If you are not taking a prescription pain medicine, ask your doctor if you can take an over-the-counter medicine.   If you think your pain medicine is making you sick to expected. After general anesthesia or intravenous sedation, for 24 hours or while taking prescription Narcotics:  Limit your activities  A responsible adult needs to be with you for the next 24 hours  Do not drive and operate hazardous machinery  Do not make important personal or business decisions  Do not drink alcoholic beverages  If you have not urinated within 8 hours after discharge, and you are experiencing discomfort from urinary retention, please go to the nearest ED. If you have sleep apnea and have a CPAP machine, please use it for all naps and sleeping. Please use caution when taking narcotics and any of your home medications that may cause drowsiness. *  Please give a list of your current medications to your Primary Care Provider. *  Please update this list whenever your medications are discontinued, doses are      changed, or new medications (including over-the-counter products) are added. *  Please carry medication information at all times in case of emergency situations. These are general instructions for a healthy lifestyle:  No smoking/ No tobacco products/ Avoid exposure to second hand smoke  Surgeon General's Warning:  Quitting smoking now greatly reduces serious risk to your health. Obesity, smoking, and sedentary lifestyle greatly increases your risk for illness  A healthy diet, regular physical exercise & weight monitoring are important for maintaining a healthy lifestyle    You may be retaining fluid if you have a history of heart failure or if you experience any of the following symptoms:  Weight gain of 3 pounds or more overnight or 5 pounds in a week, increased swelling in our hands or feet or shortness of breath while lying flat in bed. Please call your doctor as soon as you notice any of these symptoms; do not wait until your next office visit.

## 2022-08-30 NOTE — PERIOP NOTE
Discharge instructions given to [facility], patient's daugher. They verbalized understanding and was given opportunity for questions.

## 2022-08-30 NOTE — ANESTHESIA PRE PROCEDURE
Department of Anesthesiology  Preprocedure Note       Name:  Emmanuel Gee   Age:  45 y.o.  :  1970                                          MRN:  [id number]         Date:  2022      Surgeon: Toney Stallings):  Tucker Dietz MD    Procedure: Procedure(s):  DILATATION AND CURETTAGE HYSTEROSCOPY WITH NOVASURE ABLATION    Medications prior to admission:   Prior to Admission medications    Medication Sig Start Date End Date Taking? Authorizing Provider   diphenhydrAMINE (BENADRYL ALLERGY) 25 MG capsule Take 25 mg by mouth every 6 hours as needed for Itching   Yes Historical Provider, MD   nitroGLYCERIN (NITROSTAT) 0.4 MG SL tablet Place 1 tablet under the tongue every 5 minutes as needed for Chest pain up to max of 3 total doses.  If no relief after 1 dose, call 911. 22   Gisella Torres DO   ferrous sulfate (IRON 325) 325 (65 Fe) MG tablet Take 1 tablet by mouth 2 times daily 22   SAMM Lancaster       Current medications:    Current Facility-Administered Medications   Medication Dose Route Frequency Provider Last Rate Last Admin    lidocaine 1 % injection 1 mL  1 mL IntraDERmal Once PRN Jose Saha MD        fentaNYL (SUBLIMAZE) injection 100 mcg  100 mcg IntraVENous Once PRN Jose Saha MD        lactated ringers infusion  100 mL/hr IntraVENous Continuous Jose Saha MD        midazolam PF (VERSED) injection 2 mg  2 mg IntraVENous Once PRN Jose Saha MD        sodium chloride flush 0.9 % injection 5-40 mL  5-40 mL IntraVENous 2 times per day Tucker Dietz MD        sodium chloride flush 0.9 % injection 5-40 mL  5-40 mL IntraVENous PRN Tucker Dietz MD        0.9 % sodium chloride infusion   IntraVENous PRN Tucker Dietz MD        ceFAZolin (ANCEF) 2000 mg in sterile water 20 mL IV syringe  2,000 mg IntraVENous On Call to 1100 Namrata Bobo MD           Allergies:  No Known Allergies    Problem List:    Patient Active Problem List   Diagnosis Code  Vaginal bleeding N93.9    Symptomatic anemia D64.9    Adenomyosis of uterus N80.0    Iron deficiency anemia due to chronic blood loss D50.0    Shortness of breath R06.02       Past Medical History:        Diagnosis Date    COVID 2022    no hospitalization    History of blood transfusion     Hx of echocardiogram 2022    EF 55-60%, valves normal    Hyperlipidemia     Hypothyroid     no meds    Iron deficiency anemia due to chronic blood loss     Prediabetes        Past Surgical History:        Procedure Laterality Date     SECTION      X 2    CHOLECYSTECTOMY      THYROIDECTOMY      TUBAL LIGATION         Social History:    Social History     Tobacco Use    Smoking status: Never    Smokeless tobacco: Never   Substance Use Topics    Alcohol use: Not Currently     Alcohol/week: 1.0 standard drink     Types: 1 Glasses of wine per week                                Counseling given: Not Answered      Vital Signs (Current):   Vitals:    22 1614   Weight: 197 lb (89.4 kg)   Height: 5' 4\" (1.626 m)                                              BP Readings from Last 3 Encounters:   22 (!) 180/90   22 134/82   22 (!) 131/91       NPO Status:                                                                                 BMI:   Wt Readings from Last 3 Encounters:   22 197 lb (89.4 kg)   22 192 lb (87.1 kg)   22 192 lb 3.2 oz (87.2 kg)     Body mass index is 33.81 kg/m².     CBC:   Lab Results   Component Value Date/Time    WBC 11.4 2022 04:51 PM    RBC 5.34 2022 04:51 PM    HGB 12.0 2022 04:51 PM    HCT 40.8 2022 04:51 PM    MCV 76.4 2022 04:51 PM    RDW 23.9 2022 07:42 AM     2022 04:51 PM       CMP:   Lab Results   Component Value Date/Time     2022 07:42 AM    K 3.8 2022 07:42 AM     2022 07:42 AM    CO2 27 2022 07:42 AM    BUN 9 2022 07:42 AM    CREATININE 0.70 07/05/2022 07:42 AM    GFRAA >60 07/05/2022 07:42 AM    LABGLOM >60 07/05/2022 07:42 AM    GLUCOSE 93 07/05/2022 07:42 AM    PROT 7.2 07/05/2022 07:42 AM    CALCIUM 9.2 07/05/2022 07:42 AM    BILITOT 0.4 07/05/2022 07:42 AM    ALKPHOS 81 07/05/2022 07:42 AM    AST 17 07/05/2022 07:42 AM    ALT 14 07/05/2022 07:42 AM       POC Tests: No results for input(s): POCGLU, POCNA, POCK, POCCL, POCBUN, POCHEMO, POCHCT in the last 72 hours. Coags: No results found for: PROTIME, INR, APTT    HCG (If Applicable):   Lab Results   Component Value Date    PREGTESTUR Negative 07/04/2022        ABGs: No results found for: PHART, PO2ART, CPV5PJN, HQH3UIS, BEART, [id number]     Type & Screen (If Applicable):  No results found for: LABABO, LABRH    Drug/Infectious Status (If Applicable):  No results found for: HIV, HEPCAB    COVID-19 Screening (If Applicable): No results found for: COVID19        Anesthesia Evaluation    Airway: Mallampati: II  TM distance: >3 FB   Neck ROM: full  Mouth opening: > = 3 FB   Dental: normal exam   (+) poor dentition  Comment: Several decaying upper teeth, none loose    Pulmonary:normal exam  breath sounds clear to auscultation                             Cardiovascular:  Exercise tolerance: good (>4 METS),   (+) valvular problems/murmurs (MVP- palpitations):,         Rhythm: regular  Rate: normal                 ROS comment: Echo 8/22:    Left Ventricle: Normal left ventricular systolic function with a visually estimated EF of 55 - 60%. Left ventricle size is normal. Normal wall thickness. Normal wall motion. Normal diastolic function.   Tricuspid Valve: Normal RVSP. The estimated RVSP is 23 mmHg. Neuro/Psych:               GI/Hepatic/Renal:             Endo/Other:    (+) blood dyscrasia: anemia:., .                 Abdominal:             Vascular: Other Findings:           Anesthesia Plan      general     ASA 2       Induction: intravenous.       Anesthetic plan and risks discussed with patient.                         Yaneth Carlos MD   8/30/2022

## 2022-08-30 NOTE — ANESTHESIA POSTPROCEDURE EVALUATION
Department of Anesthesiology  Postprocedure Note    Patient: oSnia Lazar  MRN: [id number]  YOB: 1970  Date of evaluation: 8/30/2022      Procedure Summary     Date: 08/30/22 Room / Location: [facility] MAIN OR 02 / [facility] MAIN OR    Anesthesia Start: 0804 Anesthesia Stop: 3079    Procedure: DILATATION AND CURETTAGE HYSTEROSCOPY WITH NOVASURE ABLATION (Vagina ) Diagnosis:       Adenomyosis      Vaginal bleeding      (Adenomyosis [N80.0])      (Vaginal bleeding [N93.9])    Providers: Sima Manzano MD Responsible Provider: Tana Juarez MD    Anesthesia Type: general ASA Status: 2          Anesthesia Type: No value filed.     Carmelo Phase I: Carmelo Score: 10    Carmelo Phase II:        Anesthesia Post Evaluation    Patient location during evaluation: PACU  Patient participation: complete - patient participated  Level of consciousness: awake and alert  Airway patency: patent  Nausea & Vomiting: no nausea and no vomiting  Complications: no  Cardiovascular status: hemodynamically stable  Respiratory status: acceptable, nonlabored ventilation and spontaneous ventilation  Hydration status: euvolemic  Comments: /75   Pulse 72   Temp 97.5 °F (36.4 °C) (Temporal)   Resp 16   Ht 5' 4\" (1.626 m)   Wt 191 lb 6.4 oz (86.8 kg)   SpO2 100%   BMI 32.85 kg/m²     Multimodal analgesia pain management approach

## 2022-08-30 NOTE — BRIEF OP NOTE
Brief Postoperative Note      Patient: Azul Pratt  YOB: 1970  MRN: [id number]    Date of Procedure: 8/30/2022    Pre-Op Diagnosis: Adenomyosis [N80.0]  Vaginal bleeding [N93.9]    Post-Op Diagnosis: Same       Procedure(s):  DILATATION AND CURETTAGE HYSTEROSCOPY WITH NOVASURE ABLATION    Surgeon(s):  Jazzy Banerjee MD    Assistant:  * No surgical staff found *    Anesthesia: General    Estimated Blood Loss (mL): Minimal    Complications: None    Specimens:   ID Type Source Tests Collected by Time Destination   A : uterine currettings Tissue Tissue SURGICAL PATHOLOGY Jazzy Banerjee MD 8/30/2022 9455        Implants:  * No implants in log *      Drains: * No LDAs found *    Findings: normal appearing cavity, no ployps or surface neoplasm visualized.   Ablation withoout apparent difficulty      Electronically signed by Jazzy Banerjee MD on 8/30/2022 at 8:58 AM

## 2022-08-30 NOTE — H&P
Date: 2022        Patient Name: Andreea Blount     YOB: 1970          Chief Complaint     No chief complaint on file. Neg office bx, 2022    Menometrorragia with anemia   Admit, lupron given   No bleeding since dc on lupron   Iv iron on admit    History of Present Illness   Aileen Alcala is a 45 y. o. who presents today for scheduled visit    hx regular menses till last month. Noted continuous vaginal bleeding x one month, had arranged appt with new gyn, but not till aug. Er for what turned out to be symptomatic anemia     Hx sign reported normal pap   btl with last pregnancy      reletively recent start low dose asa     Surgery, btl, choly, thyroids  Past Medical History     Past Medical History:   Diagnosis Date    COVID 2022    no hospitalization    History of blood transfusion     Hx of echocardiogram 2022    EF 55-60%, valves normal    Hyperlipidemia     Hypothyroid     no meds    Iron deficiency anemia due to chronic blood loss     Prediabetes         Past Surgical History     Past Surgical History:   Procedure Laterality Date     SECTION      X 2    CHOLECYSTECTOMY      THYROIDECTOMY      TUBAL LIGATION          Medications    No current outpatient medications on file. Allergies   Patient has no known allergies. Social History     Social History       Tobacco History       Smoking Status  Never Assessed      Smokeless Tobacco Use  Unknown              Alcohol History       Alcohol Use Status  Not Asked              Drug Use       Drug Use Status  Not Asked              Sexual Activity       Sexually Active  Not Asked                    Family History     Family History   Problem Relation Age of Onset    Thyroid Cancer Mother         and pancreatic cancer    Heart Disease Father         36's-bypass surgery    Heart Disease Sister        Review of Systems   Review of Systems   Constitutional: Negative.     HENT: Negative. Eyes: Negative. Respiratory: Negative. Cardiovascular: Negative. Gastrointestinal: Negative. Endocrine: Negative. Genitourinary: Negative. Musculoskeletal: Negative. Skin: Negative. Allergic/Immunologic: Negative. Neurological: Negative. Hematological: Negative. Psychiatric/Behavioral: Negative. All other systems reviewed and are negative. Physical Exam     ECOG PERFORMANCE STATUS - 0-Fully active, able to carry on all pre-disease performance without restriction. Blood pressure (!) 154/93, pulse 75, temperature 98.1 °F (36.7 °C), temperature source Oral, resp. rate 16, height 5' 4\" (1.626 m), weight 191 lb 6.4 oz (86.8 kg), SpO2 100 %. Physical Exam  Vitals and nursing note reviewed. Exam conducted with a chaperone present. Constitutional:       Appearance: Normal appearance. She is normal weight. HENT:      Head: Normocephalic and atraumatic. Cardiovascular:      Rate and Rhythm: Normal rate and regular rhythm. Heart sounds: Normal heart sounds. Pulmonary:      Effort: Pulmonary effort is normal. No respiratory distress. Breath sounds: Normal breath sounds. Abdominal:      General: Abdomen is flat. Palpations: Abdomen is soft. Musculoskeletal:         General: Normal range of motion. Skin:     General: Skin is warm and dry. Neurological:      General: No focal deficit present. Mental Status: She is alert and oriented to person, place, and time. Psychiatric:         Mood and Affect: Mood normal.         Behavior: Behavior normal.         Thought Content:  Thought content normal.         Judgment: Judgment normal.       Labs        Recent Results (from the past 48 hour(s))   POCT Glucose    Collection Time: 08/30/22  7:43 AM   Result Value Ref Range    POC Glucose 105 (H) 65 - 100 mg/dL    Performed by: Keysha         No results found for:      Pathology        Imaging/Diagnostics Last 24 Hours   No results found. Radiology:    CT Result (most recent):  CT HEAD WO CONTRAST 07/06/2022    Narrative  CT HEAD WITHOUT CONTRAST. INDICATION: Headache and visual disturbance. COMPARISON: None. TECHNIQUE:   5 mm axial scans from the skull base to the vertex. Our CT  scanners use one or more of the following:  Automated exposure control,  adjustment of the mA and or kV according to patient size, iterative  reconstruction. FINDINGS:  No acute intraparenchymal hemorrhage or abnormal extra-axial fluid collection. The ventricles are normal size. No midline shift or mass effect. Posterior fossa: Unremarkable. Included portion of the:  Paranasal sinuses and mastoid air cells grossly clear. Globes and orbits grossly unremarkable. Impression  Negative for acute intracranial abnormality. PET Results (most recent):  No results found for this or any previous visit from the past 365 days. Mammo Results (most recent):  No results found for this or any previous visit from the past 365 days. US Result (most recent):  US PELVIS COMPLETE 07/04/2022    Narrative  PELVIC ULTRASOUND. HISTORY:  Low abdomen pain and abnormal bleeding    TECHNIQUE:  Transabdominal and endovaginal  images were obtained of the pelvis. FINDINGS:  - UTERUS:  11.4 cm in length. Endometrial stripe measures 5 mm. There is  heterogeneity of the myometrium. No discrete myometrial or endometrial mass is  seen. Neither ovary is identified. No definite adnexal mass. No significant free  fluid. Impression  1. Heterogeneous appearance of the myometrium. No discrete mass identified. 2.  Nonvisualization of both ovaries. Assessment      Hospital Problems             Last Modified POA    Vaginal bleeding 7/5/2022 Unknown    Adenomyosis of uterus 7/5/2022 Unknown      Diagnosis Orders   1.  Vaginal bleeding  PAP LB, Reflex HPV ASCUS        Specialty Problems    None      Plan   Hysterscopy, dilation and irwin, with endometrial ablation  Rba previously reviewd    Cardiology, preop  IMPRESSION:     Low risk to proceed with D and C/ablation as planned     Suspect the majority of symptoms are related to iron deficiency which is being addressed. Does report a possible history of MVP which is not apparent on exam, echo to clarify     Request labs from provider in [place] having reported high cholesterol     Return for AFTER PROCEDURE TO REVIEW RESULTS. Thank you for allowing me to participate in this patient's care. Please call or contact me if there are any questions or concerns regarding the above.        Yun Archer MD  08/01/22  10:25 AM                 Socorro Rodas MD  [place] [address]  Office : [phone]  Fax : [phone]

## 2022-08-30 NOTE — PROGRESS NOTES
Spiritual Care visit. Initial Visit, Pre Surgery Consult. Visit and prayer before patient goes to surgery.     Visit by Serenity Stevenson M.Ed., Th.B. ,Staff

## 2022-08-30 NOTE — ANESTHESIA PROCEDURE NOTES
Airway  Date/Time: 8/30/2022 8:17 AM  Urgency: elective    Airway not difficult    General Information and Staff    Patient location during procedure: OR  Resident/CRNA: MARTA Mason - LEANNE    Indications and Patient Condition  Indications for airway management: anesthesia  Spontaneous Ventilation: absent  Sedation level: deep  Preoxygenated: yes  MILS not maintained throughout  Mask difficulty assessment: vent by bag mask    Final Airway Details  Final airway type: supraglottic airway      Successful airway: oropharyngeal  Size 4     Number of attempts at approach: 1  Ventilation between attempts: supraglottic airway  Number of other approaches attempted: 0    no

## 2022-08-30 NOTE — OP NOTE
[facility]  OPERATIVE REPORT    Name:  Janie Naik  MR#:  [id number]  :  1970  ACCOUNT #:  [de-identified]  DATE OF SERVICE:  2022    PREOPERATIVE DIAGNOSIS:  Postmenopausal bleeding. POSTOPERATIVE DIAGNOSIS:  Postmenopausal bleeding. PROCEDURE PERFORMED:  1. Hysteroscopy. 2.  D and C.  3.  MyoSure endometrial ablation. SURGEON:  Jenna Gomez MD        ANESTHESIA:  General.    COMPLICATIONS:  .none    SPECIMENS REMOVED:  D and C specimen. ESTIMATED BLOOD LOSS:  Minimal.    PACKS:  None. DRAINS:  None. DISPOSITION:  PACU. INDICATIONS:  This is a patient with a significant medical history but continued anemia with irregular menses. She had an office biopsy, which was nondiagnostic. She has a significant cardiac history. Risks, benefits, alternatives, morbidities, and mortalities to above were reviewed and she wished to attempt for bleeding control. FINDINGS:  Cervix was palpably and visibly normal.  Uterus sounded to 11 cm, the width was 4.5. It appeared largely atrophic with no neoplastic changes noted on hysteroscopy. The endometrial ablation was undertaken with no issues regarding device function. PROCEDURE:  The patient was taken to the operating room and placed under general anesthesia, sterilely prepped and draped. Cervix grasped anteriorly, gently sounded and dilated without difficulty. Hysteroscopy with normal saline performed with findings as above. Sharp curettage done without difficulty. The MyoSure was then introduced, settings chosen and subsequently performed without difficulty or problems from the device itself. No bleeding was noted at completion. Sponge, lap, and needle counts were correct. The patient tolerated the procedure well and was taken to PACU stable per Anesthesia.       MD SHAMEKA Birmingham/[email or web address]  D:  2022 12:26  T:  2022 15:09  JOB #:  [id number]

## 2022-09-01 ENCOUNTER — TELEPHONE (OUTPATIENT)
Dept: CASE MANAGEMENT | Age: 52
End: 2022-09-01

## 2022-09-13 NOTE — PROGRESS NOTES
Pov  D and c with ablation    Name:    Uma Bacon Accession Number:   [id number]   MR #   [id number]   Date Obtained:   8/30/2022   DIAGNOSIS        A:  \"UTERINE CURETTINGS\":        FRAGMENTS OF NON-SECRETORY ENDOMETRIUM ONE OF WHICH HAS ASSOCIATED   BENIGN SMOOTH MUSCLE SUSPICIOUS FOR SUBMUCOSAL LEIOMYOMA.  FRAGMENTS OF   BENIGN SQUAMOUS EPITHELIUM
No

## 2022-09-14 ENCOUNTER — OFFICE VISIT (OUTPATIENT)
Dept: ONCOLOGY | Age: 52
End: 2022-09-14

## 2022-09-14 VITALS — HEIGHT: 64 IN | BODY MASS INDEX: 32.44 KG/M2 | WEIGHT: 190 LBS

## 2022-09-14 DIAGNOSIS — N80.03 ADENOMYOSIS OF UTERUS: Primary | ICD-10-CM

## 2022-09-14 DIAGNOSIS — N93.9 VAGINAL BLEEDING: ICD-10-CM

## 2022-09-14 PROCEDURE — 99024 POSTOP FOLLOW-UP VISIT: CPT | Performed by: OBSTETRICS & GYNECOLOGY

## 2022-09-23 ENCOUNTER — OFFICE VISIT (OUTPATIENT)
Dept: CARDIOLOGY CLINIC | Age: 52
End: 2022-09-23
Payer: COMMERCIAL

## 2022-09-23 VITALS
SYSTOLIC BLOOD PRESSURE: 124 MMHG | HEIGHT: 64 IN | HEART RATE: 76 BPM | WEIGHT: 194 LBS | BODY MASS INDEX: 33.12 KG/M2 | DIASTOLIC BLOOD PRESSURE: 78 MMHG

## 2022-09-23 DIAGNOSIS — D64.9 SYMPTOMATIC ANEMIA: ICD-10-CM

## 2022-09-23 DIAGNOSIS — R06.02 SHORTNESS OF BREATH: Primary | ICD-10-CM

## 2022-09-23 PROCEDURE — 99213 OFFICE O/P EST LOW 20 MIN: CPT | Performed by: INTERNAL MEDICINE

## 2022-09-23 ASSESSMENT — ENCOUNTER SYMPTOMS: SHORTNESS OF BREATH: 0

## 2022-09-23 NOTE — PROGRESS NOTES
[facility] CARDIOLOGY  [address], [address]  PHONE: [phone]      22    NAME:  Jeremie Wooten  : 1970  MRN: [id number]         SUBJECTIVE:   Jeremie Wooten is a 45 y.o. female seen for a follow up visit regarding the following:     Chief Complaint   Patient presents with    Results     echo            HPI:  Follow up  Results (echo)   . Follow up palpitations, dyspnea, fatigue likely d/t ELISABETH from chronic blood loss, had a D and C. Echo with reported possible mvp but appears to be normal, here for results. She is getting set up with primary care provider. Surgery went well. She has plans to begin a regular exercise program        PAST CARDIAC HISTORY:  Sep 2022       Echo - normal SF, DF and valves          Key CAD CHF Meds       Patient is on no cardiovascular meds. Past Medical History, Past Surgical History, Family history, Social History, and Medications were all reviewed with the patient today and updated as necessary. Prior to Admission medications    Medication Sig Start Date End Date Taking? Authorizing Provider   ondansetron (ZOFRAN) 4 MG tablet Take 1 tablet by mouth daily as needed for Nausea or Vomiting 22  Yes Yokasta Lopez MD   diphenhydrAMINE (BENADRYL) 25 MG capsule Take 25 mg by mouth every 6 hours as needed for Itching   Yes Historical Provider, MD   nitroGLYCERIN (NITROSTAT) 0.4 MG SL tablet Place 1 tablet under the tongue every 5 minutes as needed for Chest pain up to max of 3 total doses.  If no relief after 1 dose, call 911. 22  Yes Rodney Ko DO   ferrous sulfate (IRON 325) 325 (65 Fe) MG tablet Take 1 tablet by mouth 2 times daily 22  Yes SAMM Robles     No Known Allergies  Past Medical History:   Diagnosis Date    COVID 2022    no hospitalization    History of blood transfusion     Hx of echocardiogram 2022    EF 55-60%, valves normal    Hyperlipidemia Hypothyroid     no meds    Iron deficiency anemia due to chronic blood loss     Prediabetes      Past Surgical History:   Procedure Laterality Date     SECTION      X 2    CHOLECYSTECTOMY      DILATION AND CURETTAGE OF UTERUS N/A 2022    DILATATION AND CURETTAGE HYSTEROSCOPY WITH NOVASURE ABLATION performed by Debarah Canavan, MD at [facility] MAIN OR    THYROIDECTOMY      TUBAL LIGATION       Family History   Problem Relation Age of Onset    Thyroid Cancer Mother         and pancreatic cancer    Heart Disease Father         36's-bypass surgery    Heart Disease Sister      Social History     Tobacco Use    Smoking status: Never    Smokeless tobacco: Never   Substance Use Topics    Alcohol use: Not Currently     Alcohol/week: 1.0 standard drink     Types: 1 Glasses of wine per week       ROS:    Review of Systems   Cardiovascular:  Negative for chest pain. Respiratory:  Negative for shortness of breath. PHYSICAL EXAM:   /78   Pulse 76   Ht 5' 4\" (1.626 m)   Wt 194 lb (88 kg)   BMI 33.30 kg/m²      Wt Readings from Last 3 Encounters:   22 194 lb (88 kg)   22 190 lb (86.2 kg)   22 191 lb 6.4 oz (86.8 kg)     BP Readings from Last 3 Encounters:   22 124/78   22 132/81   22 (!) 180/90     Pulse Readings from Last 3 Encounters:   22 76   22 60   22 74           Physical Exam  Constitutional:       Appearance: Normal appearance. HENT:      Head: Normocephalic and atraumatic. Eyes:      Conjunctiva/sclera: Conjunctivae normal.   Neck:      Vascular: No carotid bruit. Cardiovascular:      Rate and Rhythm: Normal rate and regular rhythm. Heart sounds: Murmur heard. Blowing early systolic murmur is present with a grade of 2/6 at the upper right sternal border and upper left sternal border. No friction rub. No gallop. Pulmonary:      Effort: No respiratory distress. Breath sounds: No wheezing or rales.    Musculoskeletal: General: No swelling. Cervical back: Neck supple. Skin:     General: Skin is warm and dry. Neurological:      General: No focal deficit present. Mental Status: She is alert. Psychiatric:         Mood and Affect: Mood normal.         Behavior: Behavior normal.       Medical problems and test results were reviewed with the patient today. DATA REVIEW        BMP  Lab Results   Component Value Date/Time     07/05/2022 07:42 AM    K 3.8 07/05/2022 07:42 AM     07/05/2022 07:42 AM    CO2 27 07/05/2022 07:42 AM    BUN 9 07/05/2022 07:42 AM    CREATININE 0.70 07/05/2022 07:42 AM    GLUCOSE 93 07/05/2022 07:42 AM    CALCIUM 9.2 07/05/2022 07:42 AM          EKG        CXR/IMAGING        DEVICE INTERROGATION        OUTSIDE RECORDS REVIEW    Records from outside providers have been reviewed and summarized as noted in the HPI, past history and data review sections of this note, and reviewed with patient. .       ASSESSMENT and PLAN    Reginaldo Islas was seen today for results. Diagnoses and all orders for this visit:    Shortness of breath    Symptomatic anemia        IMPRESSION:    Normal echocardiogram, improving with treatment for ELISABETH    Patient is encouraged to engage in moderate physical activity for at least 30 minutes on at least 6 days of the week, and to follow a diet rich in whole grains, fruits and vegetables, proven to reduce the incidence of events related to cardiovascular disease. For more detailed information, patient is referred to www.cardiosmart.org.          Return if symptoms worsen or fail to improve. Thank you for allowing me to participate in this patient's care. Please call or contact me if there are any questions or concerns regarding the above.       Traci Carroll MD  09/23/22  3:04 PM

## 2022-09-28 ENCOUNTER — TELEPHONE (OUTPATIENT)
Dept: ONCOLOGY | Age: 52
End: 2022-09-28

## 2022-09-28 NOTE — TELEPHONE ENCOUNTER
Danika Garner at [phone] to get more information about bleeding.  I had to leave  with my direct number to call us back at [phone]

## 2022-09-29 ENCOUNTER — TELEPHONE (OUTPATIENT)
Dept: CASE MANAGEMENT | Age: 52
End: 2022-09-29

## 2022-09-29 NOTE — TELEPHONE ENCOUNTER
Sim Edison called me back yesterday and clarified the bleeding. She states it is a regular period without cramps or clots. She was under the impression that surgery and injection would stop her bleeding. Discussed with Dr Radha Raymond yesterday. Called Phani Bruno today and she is aware the Lupron injection she received would not stop her bleeding forever. The surgery was a D&C to obtain tissue sample to see if cause of her bleeding. Ablation was to help with control amount of bleeding but may not stop bleeding completely. Specimen did not reveal cancer. You can follow with your GYN or we can refer you to a GYN.  She stated understanding

## 2023-04-17 ENCOUNTER — HOSPITAL ENCOUNTER (EMERGENCY)
Age: 53
Discharge: HOME | End: 2023-04-17
Attending: STUDENT IN AN ORGANIZED HEALTH CARE EDUCATION/TRAINING PROGRAM

## 2023-04-17 ENCOUNTER — APPOINTMENT (OUTPATIENT)
Dept: GENERAL RADIOLOGY | Age: 53
End: 2023-04-17

## 2023-04-17 VITALS
DIASTOLIC BLOOD PRESSURE: 81 MMHG | SYSTOLIC BLOOD PRESSURE: 142 MMHG | WEIGHT: 191 LBS | HEART RATE: 68 BPM | RESPIRATION RATE: 17 BRPM | TEMPERATURE: 97.9 F | OXYGEN SATURATION: 100 % | HEIGHT: 64 IN | BODY MASS INDEX: 32.61 KG/M2

## 2023-04-17 DIAGNOSIS — K02.9 DENTAL CARIES: Primary | ICD-10-CM

## 2023-04-17 LAB
ALBUMIN SERPL-MCNC: 3.8 G/DL (ref 3.5–5)
ALBUMIN/GLOB SERPL: 1 (ref 0.4–1.6)
ALP SERPL-CCNC: 107 U/L (ref 45–117)
ALT SERPL-CCNC: 17 U/L (ref 13–61)
ANION GAP SERPL CALC-SCNC: 6 MMOL/L (ref 2–11)
AST SERPL-CCNC: 16 U/L (ref 15–37)
BASOPHILS # BLD: 0 K/UL (ref 0–0.2)
BASOPHILS NFR BLD: 1 % (ref 0–2)
BILIRUB SERPL-MCNC: 0.5 MG/DL (ref 0.2–1.1)
BUN SERPL-MCNC: 10 MG/DL (ref 7–18)
CALCIUM SERPL-MCNC: 9.4 MG/DL (ref 8.3–10.4)
CHLORIDE SERPL-SCNC: 107 MMOL/L (ref 98–107)
CO2 SERPL-SCNC: 26 MMOL/L (ref 21–32)
CREAT SERPL-MCNC: 0.64 MG/DL (ref 0.6–1)
DIFFERENTIAL METHOD BLD: ABNORMAL
EKG ATRIAL RATE: 61 BPM
EKG DIAGNOSIS: NORMAL
EKG P AXIS: 60 DEGREES
EKG P-R INTERVAL: 150 MS
EKG Q-T INTERVAL: 406 MS
EKG QRS DURATION: 89 MS
EKG QTC CALCULATION (BAZETT): 406 MS
EKG R AXIS: -9 DEGREES
EKG T AXIS: 32 DEGREES
EKG VENTRICULAR RATE: 60 BPM
EOSINOPHIL # BLD: 0.1 K/UL (ref 0–0.8)
EOSINOPHIL NFR BLD: 2 % (ref 0.5–7.8)
ERYTHROCYTE [DISTWIDTH] IN BLOOD BY AUTOMATED COUNT: 14.8 % (ref 11.9–14.6)
GLOBULIN SER CALC-MCNC: 3.9 G/DL (ref 2.8–4.5)
GLUCOSE SERPL-MCNC: 93 MG/DL (ref 65–100)
HCT VFR BLD AUTO: 41.7 % (ref 35.8–46.3)
HGB BLD-MCNC: 13.5 G/DL (ref 11.7–15.4)
IMM GRANULOCYTES # BLD AUTO: 0 K/UL (ref 0–0.5)
IMM GRANULOCYTES NFR BLD AUTO: 0 % (ref 0–5)
LYMPHOCYTES # BLD: 1.6 K/UL (ref 0.5–4.6)
LYMPHOCYTES NFR BLD: 19 % (ref 13–44)
MCH RBC QN AUTO: 28.5 PG (ref 26.1–32.9)
MCHC RBC AUTO-ENTMCNC: 32.4 G/DL (ref 31.4–35)
MCV RBC AUTO: 88 FL (ref 82–102)
MONOCYTES # BLD: 0.5 K/UL (ref 0.1–1.3)
MONOCYTES NFR BLD: 6 % (ref 4–12)
NEUTS SEG # BLD: 5.9 K/UL (ref 1.7–8.2)
NEUTS SEG NFR BLD: 73 % (ref 43–78)
NRBC # BLD: 0 K/UL (ref 0–0.2)
PLATELET # BLD AUTO: 218 K/UL (ref 150–450)
PMV BLD AUTO: 10.2 FL (ref 9.4–12.3)
POTASSIUM SERPL-SCNC: 3.8 MMOL/L (ref 3.5–5.1)
PROT SERPL-MCNC: 7.7 G/DL (ref 6.4–8.2)
RBC # BLD AUTO: 4.74 M/UL (ref 4.05–5.2)
SODIUM SERPL-SCNC: 139 MMOL/L (ref 133–143)
TROPONIN T SERPL HS-MCNC: <6 NG/L (ref 0–14)
WBC # BLD AUTO: 8.1 K/UL (ref 4.3–11.1)

## 2023-04-17 PROCEDURE — 96374 THER/PROPH/DIAG INJ IV PUSH: CPT

## 2023-04-17 PROCEDURE — 84484 ASSAY OF TROPONIN QUANT: CPT

## 2023-04-17 PROCEDURE — 93005 ELECTROCARDIOGRAM TRACING: CPT | Performed by: STUDENT IN AN ORGANIZED HEALTH CARE EDUCATION/TRAINING PROGRAM

## 2023-04-17 PROCEDURE — 71046 X-RAY EXAM CHEST 2 VIEWS: CPT

## 2023-04-17 PROCEDURE — 85025 COMPLETE CBC W/AUTO DIFF WBC: CPT

## 2023-04-17 PROCEDURE — 99285 EMERGENCY DEPT VISIT HI MDM: CPT

## 2023-04-17 PROCEDURE — 80053 COMPREHEN METABOLIC PANEL: CPT

## 2023-04-17 PROCEDURE — 6360000002 HC RX W HCPCS: Performed by: STUDENT IN AN ORGANIZED HEALTH CARE EDUCATION/TRAINING PROGRAM

## 2023-04-17 RX ORDER — KETOROLAC TROMETHAMINE 15 MG/ML
15 INJECTION, SOLUTION INTRAMUSCULAR; INTRAVENOUS ONCE
Status: COMPLETED | OUTPATIENT
Start: 2023-04-17 | End: 2023-04-17

## 2023-04-17 RX ORDER — SUCRALFATE 1 G/1
1 TABLET ORAL 4 TIMES DAILY
Qty: 120 TABLET | Refills: 3 | Status: SHIPPED | OUTPATIENT
Start: 2023-04-17

## 2023-04-17 RX ORDER — HYDROCODONE BITARTRATE AND ACETAMINOPHEN 7.5; 325 MG/1; MG/1
0.5 TABLET ORAL EVERY 6 HOURS PRN
Qty: 8 TABLET | Refills: 0 | Status: SHIPPED | OUTPATIENT
Start: 2023-04-17 | End: 2023-04-17 | Stop reason: SDUPTHER

## 2023-04-17 RX ORDER — KETOROLAC TROMETHAMINE 30 MG/ML
30 INJECTION, SOLUTION INTRAMUSCULAR; INTRAVENOUS ONCE
Status: DISCONTINUED | OUTPATIENT
Start: 2023-04-17 | End: 2023-04-17

## 2023-04-17 RX ORDER — SUCRALFATE 1 G/1
1 TABLET ORAL 4 TIMES DAILY
Qty: 120 TABLET | Refills: 3 | Status: SHIPPED | OUTPATIENT
Start: 2023-04-17 | End: 2023-04-17 | Stop reason: SDUPTHER

## 2023-04-17 RX ORDER — HYDROCODONE BITARTRATE AND ACETAMINOPHEN 7.5; 325 MG/1; MG/1
0.5 TABLET ORAL EVERY 6 HOURS PRN
Qty: 8 TABLET | Refills: 0 | Status: SHIPPED | OUTPATIENT
Start: 2023-04-17 | End: 2023-04-20

## 2023-04-17 RX ORDER — PENICILLIN V POTASSIUM 500 MG/1
500 TABLET ORAL 2 TIMES DAILY
Qty: 20 TABLET | Refills: 0 | Status: SHIPPED | OUTPATIENT
Start: 2023-04-17 | End: 2023-04-27

## 2023-04-17 RX ORDER — PENICILLIN V POTASSIUM 500 MG/1
500 TABLET ORAL 2 TIMES DAILY
Qty: 20 TABLET | Refills: 0 | Status: SHIPPED | OUTPATIENT
Start: 2023-04-17 | End: 2023-04-17 | Stop reason: SDUPTHER

## 2023-04-17 RX ADMIN — KETOROLAC TROMETHAMINE 15 MG: 15 INJECTION, SOLUTION INTRAMUSCULAR; INTRAVENOUS at 08:31

## 2023-04-17 ASSESSMENT — PAIN - FUNCTIONAL ASSESSMENT
PAIN_FUNCTIONAL_ASSESSMENT: 0-10
PAIN_FUNCTIONAL_ASSESSMENT: 0-10

## 2023-04-17 ASSESSMENT — PAIN SCALES - GENERAL
PAINLEVEL_OUTOF10: 7
PAINLEVEL_OUTOF10: 4

## 2023-04-17 ASSESSMENT — ENCOUNTER SYMPTOMS: SHORTNESS OF BREATH: 1

## 2023-04-17 NOTE — ED PROVIDER NOTES
Globulin 3.9 2.8 - 4.5 g/dL    Albumin/Globulin Ratio 1.0 0.4 - 1.6     Troponin T   Result Value Ref Range    Troponin T <6.0 0 - 14 ng/L   EKG 12 Lead   Result Value Ref Range    Ventricular Rate 60 BPM    Atrial Rate 61 BPM    P-R Interval 150 ms    QRS Duration 89 ms    Q-T Interval 406 ms    QTc Calculation (Bazett) 406 ms    P Axis 60 degrees    R Axis -9 degrees    T Axis 32 degrees    Diagnosis       Sinus rhythm    Confirmed by FRANNIE RESENDIZ (), RAMONE ESPOSITO ([id number]) on 4/17/2023 10:31:47 AM          XR CHEST (2 VW)   Final Result   1. Stable mild cardiomegaly without evolving acute changes evident by plain   film. Voice dictation software was used during the making of this note. This software is not perfect and grammatical and other typographical errors may be present. This note has not been completely proofread for errors.         [address] [facility]  04/17/23 6050

## 2023-04-17 NOTE — ED TRIAGE NOTES
Pt reports front upper tooth pain post eating a jelly donut 2 days ago. Reports thinking she has an abscess, pt reports has several teeth that need to come out.

## 2023-04-17 NOTE — DISCHARGE INSTRUCTIONS
Take the medication prescribed as directed. Arrange follow-up with one of the dental providers listed. In addition please arrange follow-up with your primary cardiology provider for recheck as discussed. Return for worsening symptoms, concerns or questions. Dental Services     The Emergency Department is not able to provide dental services - tooth extractions, root canal. Though we can provide antibiotics for abccess or infection. Listed below are free or low-cost options. [facility], [address] [facility]   [phone]  Tuesday and Thursday- registration starts at 10am. After registering you are given a time to return  Provides free x-rays, extractions, treatment of infection, and dental hygeine. Cannot have medicare, medicaid or other medical insurance coverage  Bring proof of [place]** residency (power bill, for example), Social Security Number and household income documents (including food stamps) as well as any current medications. (**if you do not live in [place], contact the free clinic in your home county for the availability of dental services)    [address] [employer] [address] [place], [address] [phone]  Monday and Wednesday 8a-5p Tuesday and Thursday    8a-7p Friday 12-5p  Provides Adult and Childrens services. X-rays, extractions, treatment of infection, restorative care  Accepts medicaid, private insurance, self-pay.  Fees are on a sliding scale based on income (need to bring documentation)    [facility] [address] [facility], [address]     [phone]  Monday - Friday 8am-5p  Accepts medicaid for dental (but not dentures under 21)  Provides xrays, extractions, partials and dentures    [facility], [address] [place], [address] [phone]  Monday- Friday 9a-5p  First Come- First Served  Accepts medicaid, or self pay at or near

## 2023-04-17 NOTE — ED NOTES
I have reviewed discharge instructions with the patient. The patient verbalized understanding. Patient left ED via Discharge Method: ambulatory to Home with self    Opportunity for questions and clarification provided. Patient given 3 scripts. To continue your aftercare when you leave the hospital, you may receive an automated call from our care team to check in on how you are doing. This is a free service and part of our promise to provide the best care and service to meet your aftercare needs.  If you have questions, or wish to unsubscribe from this service please call [phone]. Thank you for Choosing our [facility] Emergency Department.        Janiya Zapata RN  04/17/23 8021

## 2023-09-26 DIAGNOSIS — N80.03 ADENOMYOSIS OF UTERUS: Primary | ICD-10-CM

## (undated) DEVICE — CATHETER URETH 16FR RED RUB INTMIT ALL PURP 12 PER CA

## (undated) DEVICE — SOLUTION IRRIG 3000ML 0.9% SOD CHL USP UROMATIC PLAS CONT

## (undated) DEVICE — PAD,NON-ADHERENT,3X8,STERILE,LF,1/PK: Brand: MEDLINE

## (undated) DEVICE — GOWN,REINF,POLY,ECL,PP SLV,XL: Brand: MEDLINE

## (undated) DEVICE — DRAPE,UNDERBUTTOCKS,PCH,STERILE: Brand: MEDLINE

## (undated) DEVICE — DRAPE,T,CYSTO,STERILE: Brand: MEDLINE

## (undated) DEVICE — HANDPIECE ABLAT DIA6MM ENDOMET IMPED CTRL DEV DISP NOVASURE

## (undated) DEVICE — ELECTRODE PT RET AD L9FT HI MOIST COND ADH HYDRGEL CORDED

## (undated) DEVICE — MINOR LITHOTOMY PACK: Brand: MEDLINE INDUSTRIES, INC.

## (undated) DEVICE — CARDINAL HEALTH FLEXIBLE LIGHT HANDLE COVER: Brand: CARDINAL HEALTH